# Patient Record
Sex: MALE | Race: WHITE | NOT HISPANIC OR LATINO | URBAN - METROPOLITAN AREA
[De-identification: names, ages, dates, MRNs, and addresses within clinical notes are randomized per-mention and may not be internally consistent; named-entity substitution may affect disease eponyms.]

---

## 2017-06-23 ENCOUNTER — APPOINTMENT (OUTPATIENT)
Dept: ADMISSIONS | Facility: MEDICAL CENTER | Age: 42
End: 2017-06-23
Attending: SURGERY
Payer: COMMERCIAL

## 2017-06-23 RX ORDER — ALBUTEROL SULFATE 90 UG/1
2 AEROSOL, METERED RESPIRATORY (INHALATION) EVERY 6 HOURS PRN
COMMUNITY

## 2017-06-23 RX ORDER — MEPERIDINE HYDROCHLORIDE 50 MG/1
50 TABLET ORAL
COMMUNITY

## 2017-06-23 RX ORDER — GABAPENTIN 300 MG/1
600 CAPSULE ORAL
COMMUNITY

## 2017-06-23 RX ORDER — OMEPRAZOLE 20 MG/1
20 CAPSULE, DELAYED RELEASE ORAL DAILY
COMMUNITY

## 2017-06-27 ENCOUNTER — HOSPITAL ENCOUNTER (OUTPATIENT)
Facility: MEDICAL CENTER | Age: 42
End: 2017-06-27
Attending: SURGERY | Admitting: SURGERY
Payer: COMMERCIAL

## 2017-06-27 VITALS
HEART RATE: 68 BPM | RESPIRATION RATE: 16 BRPM | HEIGHT: 73 IN | BODY MASS INDEX: 30.8 KG/M2 | SYSTOLIC BLOOD PRESSURE: 114 MMHG | DIASTOLIC BLOOD PRESSURE: 83 MMHG | OXYGEN SATURATION: 95 % | TEMPERATURE: 97.5 F | WEIGHT: 232.37 LBS

## 2017-06-27 PROBLEM — M79.10 MYALGIA: Status: ACTIVE | Noted: 2017-06-27

## 2017-06-27 PROCEDURE — 500448 HCHG DRESSING, TELFA 3X4: Performed by: SURGERY

## 2017-06-27 PROCEDURE — 160025 RECOVERY II MINUTES (STATS): Performed by: SURGERY

## 2017-06-27 PROCEDURE — 160046 HCHG PACU - 1ST 60 MINS PHASE II: Performed by: SURGERY

## 2017-06-27 PROCEDURE — 160047 HCHG PACU  - EA ADDL 30 MINS PHASE II: Performed by: SURGERY

## 2017-06-27 PROCEDURE — 700101 HCHG RX REV CODE 250

## 2017-06-27 PROCEDURE — 160002 HCHG RECOVERY MINUTES (STAT): Performed by: SURGERY

## 2017-06-27 PROCEDURE — 88300 SURGICAL PATH GROSS: CPT

## 2017-06-27 PROCEDURE — 160028 HCHG SURGERY MINUTES - 1ST 30 MINS LEVEL 3: Performed by: SURGERY

## 2017-06-27 PROCEDURE — 160048 HCHG OR STATISTICAL LEVEL 1-5: Performed by: SURGERY

## 2017-06-27 PROCEDURE — A9270 NON-COVERED ITEM OR SERVICE: HCPCS

## 2017-06-27 PROCEDURE — 501838 HCHG SUTURE GENERAL: Performed by: SURGERY

## 2017-06-27 PROCEDURE — 502240 HCHG MISC OR SUPPLY RC 0272: Performed by: SURGERY

## 2017-06-27 PROCEDURE — 700102 HCHG RX REV CODE 250 W/ 637 OVERRIDE(OP)

## 2017-06-27 PROCEDURE — 160035 HCHG PACU - 1ST 60 MINS PHASE I: Performed by: SURGERY

## 2017-06-27 PROCEDURE — 160009 HCHG ANES TIME/MIN: Performed by: SURGERY

## 2017-06-27 PROCEDURE — A6402 STERILE GAUZE <= 16 SQ IN: HCPCS | Performed by: SURGERY

## 2017-06-27 PROCEDURE — 700111 HCHG RX REV CODE 636 W/ 250 OVERRIDE (IP)

## 2017-06-27 RX ORDER — IBUPROFEN 800 MG/1
800 TABLET ORAL
COMMUNITY

## 2017-06-27 RX ORDER — BUPIVACAINE HYDROCHLORIDE AND EPINEPHRINE 5; 5 MG/ML; UG/ML
INJECTION, SOLUTION EPIDURAL; INTRACAUDAL; PERINEURAL
Status: DISCONTINUED | OUTPATIENT
Start: 2017-06-27 | End: 2017-06-27 | Stop reason: HOSPADM

## 2017-06-27 RX ORDER — SODIUM CHLORIDE, SODIUM LACTATE, POTASSIUM CHLORIDE, CALCIUM CHLORIDE 600; 310; 30; 20 MG/100ML; MG/100ML; MG/100ML; MG/100ML
INJECTION, SOLUTION INTRAVENOUS CONTINUOUS
Status: DISCONTINUED | OUTPATIENT
Start: 2017-06-27 | End: 2017-06-27 | Stop reason: HOSPADM

## 2017-06-27 RX ORDER — OXYCODONE HCL 5 MG/5 ML
SOLUTION, ORAL ORAL
Status: COMPLETED
Start: 2017-06-27 | End: 2017-06-27

## 2017-06-27 RX ADMIN — SODIUM CHLORIDE, SODIUM LACTATE, POTASSIUM CHLORIDE, CALCIUM CHLORIDE: 600; 310; 30; 20 INJECTION, SOLUTION INTRAVENOUS at 07:15

## 2017-06-27 RX ADMIN — OXYCODONE HYDROCHLORIDE 5 MG: 5 SOLUTION ORAL at 09:40

## 2017-06-27 ASSESSMENT — PAIN SCALES - GENERAL
PAINLEVEL_OUTOF10: 0
PAINLEVEL_OUTOF10: 5

## 2017-06-27 NOTE — IP AVS SNAPSHOT
" Home Care Instructions                                                                                                                Name:Ced Emerson  Medical Record Number:[id number]  CSN: [id number]    YOB: 1975   Age: 42 y.o.  Sex: male  HT:1.854 m (6' 1\") WT: 105.4 kg (232 lb 5.8 oz)          Admit Date: 6/27/2017     Discharge Date:   Today's Date: 6/27/2017  Attending Doctor:  Saqib Andrade M.D.                  Allergies:  Compazine                Discharge Instructions         ACTIVITY: Rest and take it easy for the first 24 hours.  A responsible adult is recommended to remain with you during that time.  It is normal to feel sleepy.  We encourage you to not do anything that requires balance, judgment or coordination.    MILD FLU-LIKE SYMPTOMS ARE NORMAL. YOU MAY EXPERIENCE GENERALIZED MUSCLE ACHES, THROAT IRRITATION, HEADACHE AND/OR SOME NAUSEA.    FOR 24 HOURS DO NOT:  Drive, operate machinery or run household appliances.  Drink beer or alcoholic beverages.   Make important decisions or sign legal documents.    SPECIAL INSTRUCTIONS:   Muscle Biopsy, Care After  Refer to this sheet in the next few weeks. These instructions provide you with information on caring for yourself after your procedure. Your caregiver may also give you more specific instructions. Your treatment has been planned according to current medical practices, but problems sometimes occur. Call your caregiver if you have any problems or questions after your procedure.   HOME CARE INSTRUCTIONS   · Only take over-the-counter or prescription medicines as directed by your caregiver.    · Limit activities or movements as directed by your caregiver.    · Take showers. Do not take baths until your caregiver approves.    · Remove or change any bandages (dressings) only as directed by your caregiver. You may have skin adhesive strips over the biopsy site. Do not take the strips off. They will fall off on their own.  · Keep all " follow-up appointments with your caregiver. Ask when your test results will be ready. Make sure you get your test results.    SEEK MEDICAL CARE IF:   · You have increased bleeding from the biopsy site.   · You develop increasing redness, swelling, or pain in the area of the biopsy.    · You have yellowish white fluid (pus) coming from the biopsy site.   · You have a fever.  · You notice a bad smell coming from the biopsy site or dressing.    · You are light-headed or feel faint.    SEEK IMMEDIATE MEDICAL CARE IF:   · You develop a rash.  · You have difficulty breathing.  · You have any reaction or side effects to medicines given.  MAKE SURE YOU:  · Understand these instructions.  · Will watch your condition.  · Will get help right away if you are not doing well or get worse.     This information is not intended to replace advice given to you by your health care provider. Make sure you discuss any questions you have with your health care provider.     DIET: To avoid nausea, slowly advance diet as tolerated, avoiding spicy or greasy foods for the first day.  Add more substantial food to your diet according to your physician's instructions.  Babies can be fed formula or breast milk as soon as they are hungry.  INCREASE FLUIDS AND FIBER TO AVOID CONSTIPATION.    SURGICAL DRESSING/BATHING: *Keep dressing clean and dry.  Remove in 48hrs (Thursday, 6/29)*    FOLLOW-UP APPOINTMENT:  A follow-up appointment should be arranged with your doctor; call to schedule.    You should CALL YOUR PHYSICIAN if you develop:  Fever greater than 101 degrees F.  Pain not relieved by medication, or persistent nausea or vomiting.  Excessive bleeding (blood soaking through dressing) or unexpected drainage from the wound.  Extreme redness or swelling around the incision site, drainage of pus or foul smelling drainage.  Inability to urinate or empty your bladder within 8 hours.  Problems with breathing or chest pain.    You should call 911 if you  develop problems with breathing or chest pain.  If you are unable to contact your doctor or surgical center, you should go to the nearest emergency room or urgent care center.  Physician's telephone #: *Dr. Andrade [phone]*    If any questions arise, call your doctor.  If your doctor is not available, please feel free to call the Surgical Center at [phone].  The Center is open Monday through Friday from 7AM to 7PM.  You can also call the HEALTH HOTLINE open 24 hours/day, 7 days/week and speak to a nurse at [phone], or toll free at [phone].    A registered nurse may call you a few days after your surgery to see how you are doing after your procedure.    MEDICATIONS: Resume taking daily medication.  Take prescribed pain medication with food.  If no medication is prescribed, you may take non-aspirin pain medication if needed.  PAIN MEDICATION CAN BE VERY CONSTIPATING.  Take a stool softener or laxative such as senokot, pericolace, or milk of magnesia if needed.    Prescription given for *Norco*.  Last oral pain medication given at *9:40am*.    If your physician has prescribed pain medication that includes Acetaminophen (Tylenol), do not take additional Acetaminophen (Tylenol) while taking the prescribed medication.    Depression / Suicide Risk    As you are discharged from this [facility] facility, it is important to learn how to keep safe from harming yourself.    Recognize the warning signs:  · Abrupt changes in personality, positive or negative- including increase in energy   · Giving away possessions  · Change in eating patterns- significant weight changes-  positive or negative  · Change in sleeping patterns- unable to sleep or sleeping all the time   · Unwillingness or inability to communicate  · Depression  · Unusual sadness, discouragement and loneliness  · Talk of wanting to die  · Neglect of personal appearance   · Rebelliousness- reckless behavior  · Withdrawal from  people/activities they love  · Confusion- inability to concentrate     If you or a loved one observes any of these behaviors or has concerns about self-harm, here's what you can do:  · Talk about it- your feelings and reasons for harming yourself  · Remove any means that you might use to hurt yourself (examples: pills, rope, extension cords, firearm)  · Get professional help from the community (Mental Health, Substance Abuse, psychological counseling)  · Do not be alone:Call your Safe Contact- someone whom you trust who will be there for you.  · Call your local CRISIS HOTLINE 751-5996 or 145-513-2593  · Call your local Children's Mobile Crisis Response Team [place] (682) 742-2916 or www.Parascale  · Call the toll free National Suicide Prevention Hotlines   · National Suicide Prevention Lifeline 949-588-YTFS (3967)  · VideoStep Line Network 800-SUICIDE (007-1545)       Medication List      CONTINUE taking these medications        Instructions    Morning Afternoon Evening Bedtime    albuterol 108 (90 BASE) MCG/ACT Aers inhalation aerosol        Inhale 2 Puffs by mouth every 6 hours as needed for Shortness of Breath.   Dose:  2 Puff                        gabapentin 300 MG Caps   Commonly known as:  NEURONTIN        Take 600 mg by mouth every bedtime.   Dose:  600 mg                        ibuprofen 800 MG Tabs   Commonly known as:  MOTRIN        Take 800 mg by mouth 1 time daily as needed.   Dose:  800 mg                        meperidine 50 MG Tabs   Commonly known as:  DEMEROL        Take 50 mg by mouth at bedtime as needed for Severe Pain.   Dose:  50 mg                        PRILOSEC 20 MG delayed-release capsule   Generic drug:  omeprazole        Take 20 mg by mouth every day.   Dose:  20 mg                                Medication Information     Above and/or attached are the medications your physician expects you to take upon discharge. Review all of your home medications and newly ordered  medications with your doctor and/or pharmacist. Follow medication instructions as directed by your doctor and/or pharmacist. Please keep your medication list with you and share with your physician. Update the information when medications are discontinued, doses are changed, or new medications (including over-the-counter products) are added; and carry medication information at all times in the event of emergency situations.        Resources     Quit Smoking / Tobacco Use:    I understand the use of any tobacco products increases my chance of suffering from future heart disease or stroke and could cause other illnesses which may shorten my life. Quitting the use of tobacco products is the single most important thing I can do to improve my health. For further information on smoking / tobacco cessation call a Toll Free Quit Line at 1-191.986.2633 (*National [employer] [place]) or 1-848.816.2270 (American Lung Association) or you can access the web based program at www.lungusa.org.    [place] Tobacco Users Help Line:  (176) 802-4969       Toll Free: 1-337.286.6826  Quit Tobacco Program [facility] (466)732-1234    Crisis Hotline:    [place] Crisis Hotline:  3-549-ASXATCR or 1-969.230.9050    [place] Crisis Hotline:    1-628.763.6601 or 072-507-1326    Discharge Survey:   Thank you for choosing [facility]. We hope we did everything we could to make your hospital stay a pleasant one. You may be receiving a survey and we would appreciate your time and participation in answering the questions. Your input is very valuable to us in our efforts to improve our service to our patients and their families.            Signatures     My signature on this form indicates that:    1. I acknowledge receipt and understanding of these Home Care Instruction.  2. My questions regarding this information have been answered to my satisfaction.  3. I have formulated a plan with my discharge nurse to obtain my prescribed  medications for home.    __________________________________      __________________________________                   Patient Signature                                 Guardian/Responsible Adult Signature      __________________________________                 __________       ________                       Nurse Signature                                               Date                 Time

## 2017-06-27 NOTE — OR SURGEON
Immediate Post-Operative Note      PreOp Diagnosis: myositis    PostOp Diagnosis:   same    Procedure(s):  MUSCLE BIOPSY QUADRACEPS RIGHT - Wound Class: Clean    Surgeon(s):  Saqib Andrade M.D.    Anesthesiologist/Type of Anesthesia:  Anesthesiologist: Gabriel Chavez M.D./General    Surgical Staff:  Circulator: Dominique Ko R.N.  Scrub Person: Elizabeth Smith    Specimen:   muscle    Estimated Blood Loss:   scant    Findings:       Complications:           6/27/2017 9:22 AM Saqib Andrade

## 2017-06-27 NOTE — IP AVS SNAPSHOT
6/27/2017    Ced Emerson  [address]  [place] [address]    Dear Ced:    [facility] wants to ensure your discharge home is safe and you or your loved ones have had all of your questions answered regarding your care after you leave the hospital.    Below is a list of resources and contact information should you have any questions regarding your hospital stay, follow-up instructions, or active medical symptoms.    Questions or Concerns Regarding… Contact   Medical Questions Related to Your Discharge  (7 days a week, 8am-5pm) Contact a Nurse Care Coordinator   [phone]   Medical Questions Not Related to Your Discharge  (24 hours a day / 7 days a week)  Contact the Nurse Health Line   [phone]    Medications or Discharge Instructions Refer to your discharge packet   or contact your [facility] Primary Care Provider   [phone]   Follow-up Appointment(s) Schedule your appointment via Tujia   or contact Scheduling [phone]   Billing Review your statement via Tujia  or contact Billing [phone]   Medical Records Review your records via Tujia   or contact Medical Records [phone]     You may receive a telephone call within two days of discharge. This call is to make certain you understand your discharge instructions and have the opportunity to have any questions answered. You can also easily access your medical information, test results and upcoming appointments via the Tujia free online health management tool. You can learn more and sign up at [employer]/Tujia. For assistance setting up your Tujia account, please call [phone].    Once again, we want to ensure your discharge home is safe and that you have a clear understanding of any next steps in your care. If you have any questions or concerns, please do not hesitate to contact us, we are here for you. Thank you for choosing [facility] for your healthcare needs.    Sincerely,    Your [facility] Healthcare Team

## 2017-06-27 NOTE — DISCHARGE INSTRUCTIONS
ACTIVITY: Rest and take it easy for the first 24 hours.  A responsible adult is recommended to remain with you during that time.  It is normal to feel sleepy.  We encourage you to not do anything that requires balance, judgment or coordination.    MILD FLU-LIKE SYMPTOMS ARE NORMAL. YOU MAY EXPERIENCE GENERALIZED MUSCLE ACHES, THROAT IRRITATION, HEADACHE AND/OR SOME NAUSEA.    FOR 24 HOURS DO NOT:  Drive, operate machinery or run household appliances.  Drink beer or alcoholic beverages.   Make important decisions or sign legal documents.    SPECIAL INSTRUCTIONS:   Muscle Biopsy, Care After  Refer to this sheet in the next few weeks. These instructions provide you with information on caring for yourself after your procedure. Your caregiver may also give you more specific instructions. Your treatment has been planned according to current medical practices, but problems sometimes occur. Call your caregiver if you have any problems or questions after your procedure.   HOME CARE INSTRUCTIONS   · Only take over-the-counter or prescription medicines as directed by your caregiver.    · Limit activities or movements as directed by your caregiver.    · Take showers. Do not take baths until your caregiver approves.    · Remove or change any bandages (dressings) only as directed by your caregiver. You may have skin adhesive strips over the biopsy site. Do not take the strips off. They will fall off on their own.  · Keep all follow-up appointments with your caregiver. Ask when your test results will be ready. Make sure you get your test results.    SEEK MEDICAL CARE IF:   · You have increased bleeding from the biopsy site.   · You develop increasing redness, swelling, or pain in the area of the biopsy.    · You have yellowish white fluid (pus) coming from the biopsy site.   · You have a fever.  · You notice a bad smell coming from the biopsy site or dressing.    · You are light-headed or feel faint.    SEEK IMMEDIATE MEDICAL  CARE IF:   · You develop a rash.  · You have difficulty breathing.  · You have any reaction or side effects to medicines given.  MAKE SURE YOU:  · Understand these instructions.  · Will watch your condition.  · Will get help right away if you are not doing well or get worse.     This information is not intended to replace advice given to you by your health care provider. Make sure you discuss any questions you have with your health care provider.     DIET: To avoid nausea, slowly advance diet as tolerated, avoiding spicy or greasy foods for the first day.  Add more substantial food to your diet according to your physician's instructions.  Babies can be fed formula or breast milk as soon as they are hungry.  INCREASE FLUIDS AND FIBER TO AVOID CONSTIPATION.    SURGICAL DRESSING/BATHING: *Keep dressing clean and dry.  Remove in 48hrs (Thursday, 6/29)*    FOLLOW-UP APPOINTMENT:  A follow-up appointment should be arranged with your doctor; call to schedule.    You should CALL YOUR PHYSICIAN if you develop:  Fever greater than 101 degrees F.  Pain not relieved by medication, or persistent nausea or vomiting.  Excessive bleeding (blood soaking through dressing) or unexpected drainage from the wound.  Extreme redness or swelling around the incision site, drainage of pus or foul smelling drainage.  Inability to urinate or empty your bladder within 8 hours.  Problems with breathing or chest pain.    You should call 911 if you develop problems with breathing or chest pain.  If you are unable to contact your doctor or surgical center, you should go to the nearest emergency room or urgent care center.  Physician's telephone #: *Dr. Andrade [phone]*    If any questions arise, call your doctor.  If your doctor is not available, please feel free to call the Surgical Center at [phone].  The Center is open Monday through Friday from 7AM to 7PM.  You can also call the [email or web address] open 24 hours/day, 7 days/week and  speak to a nurse at [phone], or toll free at [phone].    A registered nurse may call you a few days after your surgery to see how you are doing after your procedure.    MEDICATIONS: Resume taking daily medication.  Take prescribed pain medication with food.  If no medication is prescribed, you may take non-aspirin pain medication if needed.  PAIN MEDICATION CAN BE VERY CONSTIPATING.  Take a stool softener or laxative such as senokot, pericolace, or milk of magnesia if needed.    Prescription given for *Norco*.  Last oral pain medication given at *9:40am*.    If your physician has prescribed pain medication that includes Acetaminophen (Tylenol), do not take additional Acetaminophen (Tylenol) while taking the prescribed medication.    Depression / Suicide Risk    As you are discharged from this [facility] facility, it is important to learn how to keep safe from harming yourself.    Recognize the warning signs:  · Abrupt changes in personality, positive or negative- including increase in energy   · Giving away possessions  · Change in eating patterns- significant weight changes-  positive or negative  · Change in sleeping patterns- unable to sleep or sleeping all the time   · Unwillingness or inability to communicate  · Depression  · Unusual sadness, discouragement and loneliness  · Talk of wanting to die  · Neglect of personal appearance   · Rebelliousness- reckless behavior  · Withdrawal from people/activities they love  · Confusion- inability to concentrate     If you or a loved one observes any of these behaviors or has concerns about self-harm, here's what you can do:  · Talk about it- your feelings and reasons for harming yourself  · Remove any means that you might use to hurt yourself (examples: pills, rope, extension cords, firearm)  · Get professional help from the community (Mental Health, Substance Abuse, psychological counseling)  · Do not be alone:Call your Safe Contact- someone whom you  trust who will be there for you.  · Call your local CRISIS HOTLINE 830-3913 or 361-331-1304  · Call your local Children's Mobile Crisis Response Team [place] (688) 516-7878 or www.LikeWhere  · Call the toll free National Suicide Prevention Hotlines   · National Suicide Prevention Lifeline 752-555-MMQY (0093)  · National [employer] Line Network 800-SUICIDE (607-8028)

## 2017-06-30 NOTE — OR SURGEON
Immediate Post-Operative Note      PreOp Diagnosis: generalized weakness with elevated CPK-rule out myositis    PostOp Diagnosis: same    Procedure(s):  MUSCLE BIOPSY QUADRACEPS RIGHT - Wound Class: Clean    Surgeon(s):  Saqib Andrade M.D.    Anesthesiologist/Type of Anesthesia:  Anesthesiologist: Gabriel Chavez M.D./General    Surgical Staff:  Circulator: Dominique Ko R.N.  Scrub Person: Elizabeth Smith    Specimen: muscle biopsy          Estimated Blood Loss:         Findings:       Complications:   The patient was taken to the operating room satisfactory general anesthesia was induced via endotracheal intubation    Patient was prepped and draped    Local instilled air while aspect of the proximal right thigh    A longitudinal incision was made and carried down through the skin and subcutaneous tissue and the fascia was scored    A ring clamp was used to sample some underlying muscle and this was given fresh to pathology    Wounds closed with interrupted 3-0 Vicryl pop-off's in 2 layers of running 4-0 Vicryl septic skin sutures and Steri-Strips    The wound was then dressed and patient sent to the recovery room in good condition    Specimen sent to pathology labeled muscle biopsy        6/30/2017 4:08 PM Saqib Andrade

## 2017-07-24 NOTE — OP REPORT
6-27-17    Preoperative diagnosis: Unexplained myalgia    Postoperative diagnosis: Same    Procedure: Left quadriceps muscle biopsy    EBL: 2 cc    Operating surgeon:Lupe    Indications for procedure: This 42-year-old male has generalized fatigue and muscle weakness and abnormal CPK.  He is referred for a quadriceps muscle biopsy to rule out an underlying myopathy or myositis    Description of procedure: The patient was taken to the operating room and satisfactory general anesthesia was induced by way of endotracheal intubation.  Patient was prepped and draped.  A small incision was made on the anterolateral aspect of the proximal left thigh and carried down to the skin and subcutaneous tissue and fascia with a small portion of muscle removed with a ring forceps with the Bovie    The wound was closed with 3-0 Vicryl pop offs and running 4-0 Monocryl subcuticular skin stitches and Steri-Strips    The wound was dressed and the patient sent to the recovery room in good condition    Specimen sent to pathology several muscle biopsy    Copy to my office

## 2025-03-12 ENCOUNTER — HOSPITAL ENCOUNTER (OUTPATIENT)
Dept: RADIOLOGY | Facility: MEDICAL CENTER | Age: 50
End: 2025-03-12

## 2025-03-12 ENCOUNTER — APPOINTMENT (OUTPATIENT)
Dept: RADIOLOGY | Facility: MEDICAL CENTER | Age: 50
DRG: 053 | End: 2025-03-12
Attending: STUDENT IN AN ORGANIZED HEALTH CARE EDUCATION/TRAINING PROGRAM
Payer: OTHER MISCELLANEOUS

## 2025-03-12 ENCOUNTER — HOSPITAL ENCOUNTER (INPATIENT)
Facility: MEDICAL CENTER | Age: 50
LOS: 6 days | DRG: 053 | End: 2025-03-18
Attending: STUDENT IN AN ORGANIZED HEALTH CARE EDUCATION/TRAINING PROGRAM | Admitting: SURGERY
Payer: OTHER MISCELLANEOUS

## 2025-03-12 DIAGNOSIS — R20.2 PARESTHESIAS: ICD-10-CM

## 2025-03-12 DIAGNOSIS — G95.19 EDEMA OF SPINAL CORD (HCC): ICD-10-CM
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
## 2025-03-12 DIAGNOSIS — T14.90XA TRAUMA: ICD-10-CM

## 2025-03-12 DIAGNOSIS — S14.125A: ICD-10-CM

## 2025-03-12 PROBLEM — Z53.09 CONTRAINDICATION TO DEEP VEIN THROMBOSIS (DVT) PROPHYLAXIS: Status: ACTIVE | Noted: 2025-03-12

## 2025-03-12 LAB
ABO GROUP BLD: NORMAL
ALBUMIN SERPL BCP-MCNC: 4.4 G/DL (ref 3.2–4.9)
ALBUMIN/GLOB SERPL: 1.8 G/DL
ALP SERPL-CCNC: 104 U/L (ref 30–99)
ALT SERPL-CCNC: 31 U/L (ref 2–50)
ANION GAP SERPL CALC-SCNC: 11 MMOL/L (ref 7–16)
APTT PPP: 30.4 SEC (ref 24.7–36)
AST SERPL-CCNC: 24 U/L (ref 12–45)
BILIRUB SERPL-MCNC: 1.3 MG/DL (ref 0.1–1.5)
BLD GP AB SCN SERPL QL: NORMAL
BUN SERPL-MCNC: 20 MG/DL (ref 8–22)
CALCIUM ALBUM COR SERPL-MCNC: 8.9 MG/DL (ref 8.5–10.5)
CALCIUM SERPL-MCNC: 9.2 MG/DL (ref 8.5–10.5)
CHLORIDE SERPL-SCNC: 103 MMOL/L (ref 96–112)
CO2 SERPL-SCNC: 23 MMOL/L (ref 20–33)
CREAT SERPL-MCNC: 0.96 MG/DL (ref 0.5–1.4)
EKG IMPRESSION: NORMAL
ERYTHROCYTE [DISTWIDTH] IN BLOOD BY AUTOMATED COUNT: 39.8 FL (ref 35.9–50)
ETHANOL BLD-MCNC: <10.1 MG/DL
GFR SERPLBLD CREATININE-BSD FMLA CKD-EPI: 95 ML/MIN/1.73 M 2
GLOBULIN SER CALC-MCNC: 2.4 G/DL (ref 1.9–3.5)
GLUCOSE SERPL-MCNC: 106 MG/DL (ref 65–99)
HCT VFR BLD AUTO: 45.1 % (ref 42–52)
HGB BLD-MCNC: 16 G/DL (ref 14–18)
INR PPP: 1.09 (ref 0.87–1.13)
MCH RBC QN AUTO: 28.3 PG (ref 27–33)
MCHC RBC AUTO-ENTMCNC: 35.5 G/DL (ref 32.3–36.5)
MCV RBC AUTO: 79.7 FL (ref 81.4–97.8)
PLATELET # BLD AUTO: 209 K/UL (ref 164–446)
PMV BLD AUTO: 8.1 FL (ref 9–12.9)
POTASSIUM SERPL-SCNC: 3.9 MMOL/L (ref 3.6–5.5)
PROT SERPL-MCNC: 6.8 G/DL (ref 6–8.2)
PROTHROMBIN TIME: 14.1 SEC (ref 12–14.6)
RBC # BLD AUTO: 5.66 M/UL (ref 4.7–6.1)
RH BLD: NORMAL
SODIUM SERPL-SCNC: 137 MMOL/L (ref 135–145)
WBC # BLD AUTO: 8.2 K/UL (ref 4.8–10.8)

## 2025-03-12 PROCEDURE — 700105 HCHG RX REV CODE 258: Performed by: NURSE PRACTITIONER

## 2025-03-12 PROCEDURE — 85610 PROTHROMBIN TIME: CPT

## 2025-03-12 PROCEDURE — 86850 RBC ANTIBODY SCREEN: CPT

## 2025-03-12 PROCEDURE — G0390 TRAUMA RESPONS W/HOSP CRITI: HCPCS

## 2025-03-12 PROCEDURE — 85027 COMPLETE CBC AUTOMATED: CPT

## 2025-03-12 PROCEDURE — 99223 1ST HOSP IP/OBS HIGH 75: CPT | Performed by: SURGERY

## 2025-03-12 PROCEDURE — 99291 CRITICAL CARE FIRST HOUR: CPT

## 2025-03-12 PROCEDURE — 86901 BLOOD TYPING SEROLOGIC RH(D): CPT

## 2025-03-12 PROCEDURE — 85730 THROMBOPLASTIN TIME PARTIAL: CPT

## 2025-03-12 PROCEDURE — 86900 BLOOD TYPING SEROLOGIC ABO: CPT

## 2025-03-12 PROCEDURE — A9270 NON-COVERED ITEM OR SERVICE: HCPCS | Performed by: NURSE PRACTITIONER

## 2025-03-12 PROCEDURE — 700102 HCHG RX REV CODE 250 W/ 637 OVERRIDE(OP): Performed by: NURSE PRACTITIONER

## 2025-03-12 PROCEDURE — 36415 COLL VENOUS BLD VENIPUNCTURE: CPT

## 2025-03-12 PROCEDURE — 80053 COMPREHEN METABOLIC PANEL: CPT

## 2025-03-12 PROCEDURE — 82077 ASSAY SPEC XCP UR&BREATH IA: CPT

## 2025-03-12 PROCEDURE — 770022 HCHG ROOM/CARE - ICU (200)

## 2025-03-12 PROCEDURE — 700111 HCHG RX REV CODE 636 W/ 250 OVERRIDE (IP): Mod: JZ | Performed by: NURSE PRACTITIONER

## 2025-03-12 PROCEDURE — 93005 ELECTROCARDIOGRAM TRACING: CPT | Mod: TC | Performed by: SURGERY

## 2025-03-12 RX ORDER — SODIUM CHLORIDE 9 MG/ML
INJECTION, SOLUTION INTRAVENOUS CONTINUOUS
Status: DISCONTINUED | OUTPATIENT
Start: 2025-03-12 | End: 2025-03-16 | Stop reason: ALTCHOICE

## 2025-03-12 RX ORDER — OXYCODONE HYDROCHLORIDE 5 MG/1
5 TABLET ORAL
Refills: 0 | Status: DISCONTINUED | OUTPATIENT
Start: 2025-03-12 | End: 2025-03-18 | Stop reason: HOSPADM

## 2025-03-12 RX ORDER — POLYETHYLENE GLYCOL 3350 17 G/17G
1 POWDER, FOR SOLUTION ORAL 2 TIMES DAILY
Status: DISCONTINUED | OUTPATIENT
Start: 2025-03-12 | End: 2025-03-18 | Stop reason: HOSPADM

## 2025-03-12 RX ORDER — SODIUM CHLORIDE 9 MG/ML
1000 INJECTION, SOLUTION INTRAVENOUS
Status: COMPLETED | OUTPATIENT
Start: 2025-03-12 | End: 2025-03-13

## 2025-03-12 RX ORDER — BISACODYL 10 MG
10 SUPPOSITORY, RECTAL RECTAL
Status: DISCONTINUED | OUTPATIENT
Start: 2025-03-12 | End: 2025-03-18 | Stop reason: HOSPADM

## 2025-03-12 RX ORDER — GABAPENTIN 100 MG/1
100 CAPSULE ORAL EVERY 8 HOURS
Status: DISCONTINUED | OUTPATIENT
Start: 2025-03-12 | End: 2025-03-14

## 2025-03-12 RX ORDER — ONDANSETRON 2 MG/ML
4 INJECTION INTRAMUSCULAR; INTRAVENOUS EVERY 4 HOURS PRN
Status: DISCONTINUED | OUTPATIENT
Start: 2025-03-12 | End: 2025-03-18 | Stop reason: HOSPADM

## 2025-03-12 RX ORDER — ALBUTEROL SULFATE 90 UG/1
2 INHALANT RESPIRATORY (INHALATION) PRN
Status: DISCONTINUED | OUTPATIENT
Start: 2025-03-12 | End: 2025-03-13

## 2025-03-12 RX ORDER — ACETAMINOPHEN 500 MG
1000 TABLET ORAL EVERY 6 HOURS
Status: DISCONTINUED | OUTPATIENT
Start: 2025-03-13 | End: 2025-03-13

## 2025-03-12 RX ORDER — ACETAMINOPHEN 500 MG
1000 TABLET ORAL EVERY 6 HOURS PRN
Status: DISCONTINUED | OUTPATIENT
Start: 2025-03-18 | End: 2025-03-13

## 2025-03-12 RX ORDER — METAXALONE 800 MG/1
800 TABLET ORAL 3 TIMES DAILY
Status: DISCONTINUED | OUTPATIENT
Start: 2025-03-13 | End: 2025-03-18 | Stop reason: HOSPADM

## 2025-03-12 RX ORDER — AMOXICILLIN 250 MG
1 CAPSULE ORAL
Status: DISCONTINUED | OUTPATIENT
Start: 2025-03-12 | End: 2025-03-18 | Stop reason: HOSPADM

## 2025-03-12 RX ORDER — ONDANSETRON 4 MG/1
4 TABLET, ORALLY DISINTEGRATING ORAL EVERY 4 HOURS PRN
Status: DISCONTINUED | OUTPATIENT
Start: 2025-03-12 | End: 2025-03-18 | Stop reason: HOSPADM

## 2025-03-12 RX ORDER — DOCUSATE SODIUM 100 MG/1
100 CAPSULE, LIQUID FILLED ORAL 2 TIMES DAILY
Status: DISCONTINUED | OUTPATIENT
Start: 2025-03-12 | End: 2025-03-18 | Stop reason: HOSPADM

## 2025-03-12 RX ORDER — OXYCODONE HYDROCHLORIDE 10 MG/1
10 TABLET ORAL
Refills: 0 | Status: DISCONTINUED | OUTPATIENT
Start: 2025-03-12 | End: 2025-03-18 | Stop reason: HOSPADM

## 2025-03-12 RX ORDER — HYDROMORPHONE HYDROCHLORIDE 1 MG/ML
0.5 INJECTION, SOLUTION INTRAMUSCULAR; INTRAVENOUS; SUBCUTANEOUS
Status: DISCONTINUED | OUTPATIENT
Start: 2025-03-12 | End: 2025-03-18 | Stop reason: HOSPADM

## 2025-03-12 RX ORDER — SODIUM PHOSPHATE,MONO-DIBASIC 19G-7G/118
1 ENEMA (ML) RECTAL
Status: DISCONTINUED | OUTPATIENT
Start: 2025-03-12 | End: 2025-03-18 | Stop reason: HOSPADM

## 2025-03-12 RX ORDER — AMOXICILLIN 250 MG
1 CAPSULE ORAL NIGHTLY
Status: DISCONTINUED | OUTPATIENT
Start: 2025-03-12 | End: 2025-03-18 | Stop reason: HOSPADM

## 2025-03-12 RX ORDER — PROCHLORPERAZINE EDISYLATE 5 MG/ML
5-10 INJECTION INTRAMUSCULAR; INTRAVENOUS EVERY 4 HOURS PRN
Status: DISCONTINUED | OUTPATIENT
Start: 2025-03-12 | End: 2025-03-12

## 2025-03-12 RX ORDER — MIDODRINE HYDROCHLORIDE 5 MG/1
5 TABLET ORAL EVERY 8 HOURS
Status: DISCONTINUED | OUTPATIENT
Start: 2025-03-12 | End: 2025-03-15

## 2025-03-12 RX ORDER — NOREPINEPHRINE BITARTRATE 0.03 MG/ML
0-1 INJECTION, SOLUTION INTRAVENOUS CONTINUOUS
Status: DISCONTINUED | OUTPATIENT
Start: 2025-03-12 | End: 2025-03-16

## 2025-03-12 RX ADMIN — ONDANSETRON 4 MG: 2 INJECTION INTRAMUSCULAR; INTRAVENOUS at 22:51

## 2025-03-12 RX ADMIN — SODIUM CHLORIDE: 9 INJECTION, SOLUTION INTRAVENOUS at 23:15

## 2025-03-12 RX ADMIN — MIDODRINE HYDROCHLORIDE 5 MG: 5 TABLET ORAL at 23:12

## 2025-03-12 RX ADMIN — GABAPENTIN 100 MG: 100 CAPSULE ORAL at 23:12

## 2025-03-12 RX ADMIN — ACETAMINOPHEN 1000 MG: 500 TABLET ORAL at 23:12

## 2025-03-12 ASSESSMENT — COPD QUESTIONNAIRES
DURING THE PAST 4 WEEKS HOW MUCH DID YOU FEEL SHORT OF BREATH: NONE/LITTLE OF THE TIME
HAVE YOU SMOKED AT LEAST 100 CIGARETTES IN YOUR ENTIRE LIFE: YES
COPD SCREENING SCORE: 3
DO YOU EVER COUGH UP ANY MUCUS OR PHLEGM?: NO/ONLY WITH OCCASIONAL COLDS OR INFECTIONS

## 2025-03-12 ASSESSMENT — PAIN DESCRIPTION - PAIN TYPE: TYPE: ACUTE PAIN

## 2025-03-13 ENCOUNTER — APPOINTMENT (OUTPATIENT)
Dept: RADIOLOGY | Facility: MEDICAL CENTER | Age: 50
DRG: 053 | End: 2025-03-13
Attending: PHYSICIAN ASSISTANT
Payer: OTHER MISCELLANEOUS

## 2025-03-13 ENCOUNTER — APPOINTMENT (OUTPATIENT)
Dept: RADIOLOGY | Facility: MEDICAL CENTER | Age: 50
DRG: 053 | End: 2025-03-13
Attending: NURSE PRACTITIONER
Payer: OTHER MISCELLANEOUS

## 2025-03-13 PROBLEM — R10.13 DYSPEPSIA: Status: ACTIVE | Noted: 2025-03-13

## 2025-03-13 PROBLEM — Z78.9 NO CONTRAINDICATION TO DEEP VEIN THROMBOSIS (DVT) PROPHYLAXIS: Status: ACTIVE | Noted: 2025-03-12

## 2025-03-13 PROBLEM — J45.909 ASTHMA: Status: ACTIVE | Noted: 2025-03-13

## 2025-03-13 PROBLEM — K21.9 ACID REFLUX: Status: ACTIVE | Noted: 2025-03-13

## 2025-03-13 LAB
ABO + RH BLD: NORMAL
ALBUMIN SERPL BCP-MCNC: 3.9 G/DL (ref 3.2–4.9)
ALBUMIN/GLOB SERPL: 1.7 G/DL
ALP SERPL-CCNC: 94 U/L (ref 30–99)
ALT SERPL-CCNC: 25 U/L (ref 2–50)
ANION GAP SERPL CALC-SCNC: 11 MMOL/L (ref 7–16)
AST SERPL-CCNC: 21 U/L (ref 12–45)
BASOPHILS # BLD AUTO: 0.4 % (ref 0–1.8)
BASOPHILS # BLD: 0.03 K/UL (ref 0–0.12)
BILIRUB SERPL-MCNC: 1.3 MG/DL (ref 0.1–1.5)
BUN SERPL-MCNC: 20 MG/DL (ref 8–22)
CALCIUM ALBUM COR SERPL-MCNC: 8.3 MG/DL (ref 8.5–10.5)
CALCIUM SERPL-MCNC: 8.2 MG/DL (ref 8.5–10.5)
CHLORIDE SERPL-SCNC: 106 MMOL/L (ref 96–112)
CO2 SERPL-SCNC: 21 MMOL/L (ref 20–33)
CREAT SERPL-MCNC: 0.92 MG/DL (ref 0.5–1.4)
EOSINOPHIL # BLD AUTO: 0.04 K/UL (ref 0–0.51)
EOSINOPHIL NFR BLD: 0.6 % (ref 0–6.9)
ERYTHROCYTE [DISTWIDTH] IN BLOOD BY AUTOMATED COUNT: 40.5 FL (ref 35.9–50)
GFR SERPLBLD CREATININE-BSD FMLA CKD-EPI: 100 ML/MIN/1.73 M 2
GLOBULIN SER CALC-MCNC: 2.3 G/DL (ref 1.9–3.5)
GLUCOSE BLD STRIP.AUTO-MCNC: 117 MG/DL (ref 65–99)
GLUCOSE BLD STRIP.AUTO-MCNC: 124 MG/DL (ref 65–99)
GLUCOSE SERPL-MCNC: 96 MG/DL (ref 65–99)
HCT VFR BLD AUTO: 44.6 % (ref 42–52)
HGB BLD-MCNC: 15.2 G/DL (ref 14–18)
IMM GRANULOCYTES # BLD AUTO: 0.02 K/UL (ref 0–0.11)
IMM GRANULOCYTES NFR BLD AUTO: 0.3 % (ref 0–0.9)
LYMPHOCYTES # BLD AUTO: 1.29 K/UL (ref 1–4.8)
LYMPHOCYTES NFR BLD: 18.6 % (ref 22–41)
MAGNESIUM SERPL-MCNC: 1.8 MG/DL (ref 1.5–2.5)
MCH RBC QN AUTO: 27.9 PG (ref 27–33)
MCHC RBC AUTO-ENTMCNC: 34.1 G/DL (ref 32.3–36.5)
MCV RBC AUTO: 82 FL (ref 81.4–97.8)
MONOCYTES # BLD AUTO: 0.56 K/UL (ref 0–0.85)
MONOCYTES NFR BLD AUTO: 8.1 % (ref 0–13.4)
NEUTROPHILS # BLD AUTO: 4.98 K/UL (ref 1.82–7.42)
NEUTROPHILS NFR BLD: 72 % (ref 44–72)
NRBC # BLD AUTO: 0 K/UL
NRBC BLD-RTO: 0 /100 WBC (ref 0–0.2)
PHOSPHATE SERPL-MCNC: 2.8 MG/DL (ref 2.5–4.5)
PLATELET # BLD AUTO: 207 K/UL (ref 164–446)
PMV BLD AUTO: 8.1 FL (ref 9–12.9)
POTASSIUM SERPL-SCNC: 4.3 MMOL/L (ref 3.6–5.5)
PROT SERPL-MCNC: 6.2 G/DL (ref 6–8.2)
RBC # BLD AUTO: 5.44 M/UL (ref 4.7–6.1)
SODIUM SERPL-SCNC: 138 MMOL/L (ref 135–145)
WBC # BLD AUTO: 6.9 K/UL (ref 4.8–10.8)

## 2025-03-13 PROCEDURE — 700111 HCHG RX REV CODE 636 W/ 250 OVERRIDE (IP): Performed by: PHYSICIAN ASSISTANT

## 2025-03-13 PROCEDURE — 700111 HCHG RX REV CODE 636 W/ 250 OVERRIDE (IP): Mod: JZ | Performed by: SURGERY

## 2025-03-13 PROCEDURE — A9270 NON-COVERED ITEM OR SERVICE: HCPCS | Performed by: PHYSICIAN ASSISTANT

## 2025-03-13 PROCEDURE — A9270 NON-COVERED ITEM OR SERVICE: HCPCS | Performed by: NURSE PRACTITIONER

## 2025-03-13 PROCEDURE — 700111 HCHG RX REV CODE 636 W/ 250 OVERRIDE (IP): Mod: JZ | Performed by: NURSE PRACTITIONER

## 2025-03-13 PROCEDURE — 82962 GLUCOSE BLOOD TEST: CPT

## 2025-03-13 PROCEDURE — 83735 ASSAY OF MAGNESIUM: CPT

## 2025-03-13 PROCEDURE — 770022 HCHG ROOM/CARE - ICU (200)

## 2025-03-13 PROCEDURE — 99291 CRITICAL CARE FIRST HOUR: CPT | Performed by: SURGERY

## 2025-03-13 PROCEDURE — 84100 ASSAY OF PHOSPHORUS: CPT

## 2025-03-13 PROCEDURE — 74018 RADEX ABDOMEN 1 VIEW: CPT

## 2025-03-13 PROCEDURE — 700101 HCHG RX REV CODE 250: Performed by: NURSE PRACTITIONER

## 2025-03-13 PROCEDURE — 86901 BLOOD TYPING SEROLOGIC RH(D): CPT

## 2025-03-13 PROCEDURE — 700102 HCHG RX REV CODE 250 W/ 637 OVERRIDE(OP): Performed by: NURSE PRACTITIONER

## 2025-03-13 PROCEDURE — 700102 HCHG RX REV CODE 250 W/ 637 OVERRIDE(OP): Performed by: PHYSICIAN ASSISTANT

## 2025-03-13 PROCEDURE — 85025 COMPLETE CBC W/AUTO DIFF WBC: CPT

## 2025-03-13 PROCEDURE — 86900 BLOOD TYPING SEROLOGIC ABO: CPT

## 2025-03-13 PROCEDURE — 71045 X-RAY EXAM CHEST 1 VIEW: CPT

## 2025-03-13 PROCEDURE — 700105 HCHG RX REV CODE 258: Performed by: NURSE PRACTITIONER

## 2025-03-13 PROCEDURE — 700105 HCHG RX REV CODE 258: Performed by: SURGERY

## 2025-03-13 PROCEDURE — 80053 COMPREHEN METABOLIC PANEL: CPT

## 2025-03-13 RX ORDER — ACETAMINOPHEN 500 MG
1000 TABLET ORAL EVERY 8 HOURS PRN
Status: DISCONTINUED | OUTPATIENT
Start: 2025-03-17 | End: 2025-03-18 | Stop reason: HOSPADM

## 2025-03-13 RX ORDER — BUTALBITAL, ACETAMINOPHEN AND CAFFEINE 50; 325; 40 MG/1; MG/1; MG/1
1 TABLET ORAL EVERY 8 HOURS PRN
Status: DISCONTINUED | OUTPATIENT
Start: 2025-03-13 | End: 2025-03-13

## 2025-03-13 RX ORDER — ALBUTEROL SULFATE 90 UG/1
2 INHALANT RESPIRATORY (INHALATION) EVERY 6 HOURS PRN
Status: DISCONTINUED | OUTPATIENT
Start: 2025-03-13 | End: 2025-03-18 | Stop reason: HOSPADM

## 2025-03-13 RX ORDER — OMEPRAZOLE 20 MG/1
20 CAPSULE, DELAYED RELEASE ORAL DAILY
Status: DISCONTINUED | OUTPATIENT
Start: 2025-03-13 | End: 2025-03-18 | Stop reason: HOSPADM

## 2025-03-13 RX ORDER — ENOXAPARIN SODIUM 100 MG/ML
30 INJECTION SUBCUTANEOUS EVERY 12 HOURS
Status: DISCONTINUED | OUTPATIENT
Start: 2025-03-13 | End: 2025-03-18 | Stop reason: HOSPADM

## 2025-03-13 RX ORDER — ACETAMINOPHEN 500 MG
1000 TABLET ORAL EVERY 8 HOURS
Status: DISPENSED | OUTPATIENT
Start: 2025-03-13 | End: 2025-03-17

## 2025-03-13 RX ORDER — MAGNESIUM SULFATE HEPTAHYDRATE 40 MG/ML
2 INJECTION, SOLUTION INTRAVENOUS ONCE
Status: COMPLETED | OUTPATIENT
Start: 2025-03-13 | End: 2025-03-13

## 2025-03-13 RX ADMIN — POLYETHYLENE GLYCOL 3350 1 PACKET: 17 POWDER, FOR SOLUTION ORAL at 17:19

## 2025-03-13 RX ADMIN — MIDODRINE HYDROCHLORIDE 5 MG: 5 TABLET ORAL at 15:32

## 2025-03-13 RX ADMIN — METAXALONE 800 MG: 800 TABLET ORAL at 17:19

## 2025-03-13 RX ADMIN — DOCUSATE SODIUM 100 MG: 100 CAPSULE, LIQUID FILLED ORAL at 17:19

## 2025-03-13 RX ADMIN — ACETAMINOPHEN 1000 MG: 500 TABLET ORAL at 21:20

## 2025-03-13 RX ADMIN — FAMOTIDINE 20 MG: 10 INJECTION, SOLUTION INTRAVENOUS at 13:18

## 2025-03-13 RX ADMIN — NOREPINEPHRINE BITARTRATE 0.1 MCG/KG/MIN: 1 INJECTION INTRAVENOUS at 03:34

## 2025-03-13 RX ADMIN — SODIUM CHLORIDE: 9 INJECTION, SOLUTION INTRAVENOUS at 20:11

## 2025-03-13 RX ADMIN — OXYCODONE HYDROCHLORIDE 5 MG: 5 TABLET ORAL at 01:22

## 2025-03-13 RX ADMIN — ENOXAPARIN SODIUM 30 MG: 100 INJECTION SUBCUTANEOUS at 17:19

## 2025-03-13 RX ADMIN — MIDODRINE HYDROCHLORIDE 5 MG: 5 TABLET ORAL at 21:20

## 2025-03-13 RX ADMIN — GABAPENTIN 100 MG: 100 CAPSULE ORAL at 21:20

## 2025-03-13 RX ADMIN — GABAPENTIN 100 MG: 100 CAPSULE ORAL at 13:15

## 2025-03-13 RX ADMIN — OMEPRAZOLE 20 MG: 20 CAPSULE, DELAYED RELEASE ORAL at 15:32

## 2025-03-13 RX ADMIN — GABAPENTIN 100 MG: 100 CAPSULE ORAL at 05:26

## 2025-03-13 RX ADMIN — CALCIUM CHLORIDE 1000 MG: 100 INJECTION, SOLUTION INTRAVENOUS at 10:06

## 2025-03-13 RX ADMIN — SODIUM CHLORIDE 1000 ML: 9 INJECTION, SOLUTION INTRAVENOUS at 00:00

## 2025-03-13 RX ADMIN — SODIUM CHLORIDE: 9 INJECTION, SOLUTION INTRAVENOUS at 08:47

## 2025-03-13 RX ADMIN — ONDANSETRON 4 MG: 2 INJECTION INTRAMUSCULAR; INTRAVENOUS at 10:33

## 2025-03-13 RX ADMIN — METAXALONE 800 MG: 800 TABLET ORAL at 05:25

## 2025-03-13 RX ADMIN — OXYCODONE HYDROCHLORIDE 10 MG: 10 TABLET ORAL at 07:20

## 2025-03-13 RX ADMIN — SODIUM CHLORIDE 1000 ML: 9 INJECTION, SOLUTION INTRAVENOUS at 02:20

## 2025-03-13 RX ADMIN — ALBUTEROL SULFATE 2 PUFF: 90 AEROSOL, METERED RESPIRATORY (INHALATION) at 18:03

## 2025-03-13 RX ADMIN — MAGNESIUM SULFATE HEPTAHYDRATE 2 G: 2 INJECTION, SOLUTION INTRAVENOUS at 10:00

## 2025-03-13 RX ADMIN — METAXALONE 800 MG: 800 TABLET ORAL at 13:15

## 2025-03-13 RX ADMIN — ACETAMINOPHEN 1000 MG: 500 TABLET ORAL at 13:15

## 2025-03-13 RX ADMIN — MIDODRINE HYDROCHLORIDE 5 MG: 5 TABLET ORAL at 05:26

## 2025-03-13 RX ADMIN — OXYCODONE HYDROCHLORIDE 10 MG: 10 TABLET ORAL at 10:24

## 2025-03-13 RX ADMIN — ACETAMINOPHEN 1000 MG: 500 TABLET ORAL at 05:25

## 2025-03-13 SDOH — ECONOMIC STABILITY: TRANSPORTATION INSECURITY
IN THE PAST 12 MONTHS, HAS LACK OF RELIABLE TRANSPORTATION KEPT YOU FROM MEDICAL APPOINTMENTS, MEETINGS, WORK OR FROM GETTING THINGS NEEDED FOR DAILY LIVING?: NO

## 2025-03-13 SDOH — ECONOMIC STABILITY: TRANSPORTATION INSECURITY
IN THE PAST 12 MONTHS, HAS THE LACK OF TRANSPORTATION KEPT YOU FROM MEDICAL APPOINTMENTS OR FROM GETTING MEDICATIONS?: NO

## 2025-03-13 ASSESSMENT — LIFESTYLE VARIABLES
HAVE PEOPLE ANNOYED YOU BY CRITICIZING YOUR DRINKING: NO
ON A TYPICAL DAY WHEN YOU DRINK ALCOHOL HOW MANY DRINKS DO YOU HAVE: 0
EVER FELT BAD OR GUILTY ABOUT YOUR DRINKING: NO
CONSUMPTION TOTAL: NEGATIVE
EVER HAD A DRINK FIRST THING IN THE MORNING TO STEADY YOUR NERVES TO GET RID OF A HANGOVER: NO
AVERAGE NUMBER OF DAYS PER WEEK YOU HAVE A DRINK CONTAINING ALCOHOL: 0
TOTAL SCORE: 0
ALCOHOL_USE: NO
HOW MANY TIMES IN THE PAST YEAR HAVE YOU HAD 5 OR MORE DRINKS IN A DAY: 0
HAVE YOU EVER FELT YOU SHOULD CUT DOWN ON YOUR DRINKING: NO
DOES PATIENT WANT TO STOP DRINKING: NO

## 2025-03-13 ASSESSMENT — COGNITIVE AND FUNCTIONAL STATUS - GENERAL
TOILETING: A LITTLE
TURNING FROM BACK TO SIDE WHILE IN FLAT BAD: A LITTLE
CLIMB 3 TO 5 STEPS WITH RAILING: A LITTLE
PERSONAL GROOMING: A LITTLE
EATING MEALS: A LITTLE
SUGGESTED CMS G CODE MODIFIER DAILY ACTIVITY: CK
DAILY ACTIVITIY SCORE: 18
MOVING TO AND FROM BED TO CHAIR: A LITTLE
SUGGESTED CMS G CODE MODIFIER MOBILITY: CK
HELP NEEDED FOR BATHING: A LITTLE
STANDING UP FROM CHAIR USING ARMS: A LITTLE
MOBILITY SCORE: 18
DRESSING REGULAR UPPER BODY CLOTHING: A LITTLE
MOVING FROM LYING ON BACK TO SITTING ON SIDE OF FLAT BED: A LITTLE
DRESSING REGULAR LOWER BODY CLOTHING: A LITTLE
WALKING IN HOSPITAL ROOM: A LITTLE

## 2025-03-13 ASSESSMENT — ENCOUNTER SYMPTOMS
TINGLING: 1
MYALGIAS: 1
FOCAL WEAKNESS: 1
FEVER: 0
NECK PAIN: 1
SENSORY CHANGE: 1
COUGH: 0
CHILLS: 0
SHORTNESS OF BREATH: 0
HEADACHES: 1

## 2025-03-13 ASSESSMENT — PAIN DESCRIPTION - PAIN TYPE
TYPE: ACUTE PAIN

## 2025-03-13 ASSESSMENT — PATIENT HEALTH QUESTIONNAIRE - PHQ9
2. FEELING DOWN, DEPRESSED, IRRITABLE, OR HOPELESS: NOT AT ALL
1. LITTLE INTEREST OR PLEASURE IN DOING THINGS: NOT AT ALL
SUM OF ALL RESPONSES TO PHQ9 QUESTIONS 1 AND 2: 0

## 2025-03-13 ASSESSMENT — SOCIAL DETERMINANTS OF HEALTH (SDOH)
WITHIN THE LAST YEAR, HAVE TO BEEN RAPED OR FORCED TO HAVE ANY KIND OF SEXUAL ACTIVITY BY YOUR PARTNER OR EX-PARTNER?: NO
IN THE PAST 12 MONTHS, HAS THE ELECTRIC, GAS, OIL, OR WATER COMPANY THREATENED TO SHUT OFF SERVICE IN YOUR HOME?: ALREADY SHUT OFF
WITHIN THE PAST 12 MONTHS, YOU WORRIED THAT YOUR FOOD WOULD RUN OUT BEFORE YOU GOT THE MONEY TO BUY MORE: SOMETIMES TRUE
WITHIN THE PAST 12 MONTHS, THE FOOD YOU BOUGHT JUST DIDN'T LAST AND YOU DIDN'T HAVE MONEY TO GET MORE: SOMETIMES TRUE
WITHIN THE LAST YEAR, HAVE YOU BEEN KICKED, HIT, SLAPPED, OR OTHERWISE PHYSICALLY HURT BY YOUR PARTNER OR EX-PARTNER?: NO
WITHIN THE LAST YEAR, HAVE YOU BEEN AFRAID OF YOUR PARTNER OR EX-PARTNER?: NO
WITHIN THE LAST YEAR, HAVE YOU BEEN HUMILIATED OR EMOTIONALLY ABUSED IN OTHER WAYS BY YOUR PARTNER OR EX-PARTNER?: NO

## 2025-03-13 NOTE — PROGRESS NOTES
Patient arrived to unit at 2254.   Temp: 97.2f temporal  BP: 135/83  HR: 64  RR: 18  O2: 93 2L NC  Weight: 100kg    4 Eyes Skin Assessment Completed by CODY Rivers and CODY Quintero.    Head WDL  Ears Red but blanching   Nose WDL  Mouth WDL  Neck C collar in place, skin intact   Breast/Chest WDL  Shoulder Blades Redness and Blanching  Spine Redness and Blanching  (R) Arm/Elbow/Hand Bruising and Abrasion  (L) Arm/Elbow/Hand WDL  Abdomen Scar RLQ  Groin Redness and Blanching  Scrotum/Coccyx/Buttocks Redness and Blanching  (R) Leg Scar and Bruising  (L) Leg Bruising  (R) Heel/Foot/Toe WDL  (L) Heel/Foot/Toe Blood blister          Devices In Places Blood Pressure Cuff, Pulse Ox, SCD's, and Cervical Collar      Interventions In Place Gray Ear Foams, Sacral Mepilex, Pillows, and Q2 Turns    Possible Skin Injury No    Pictures Uploaded Into Epic N/A  Wound Consult Placed N/A  RN Wound Prevention Protocol Ordered No

## 2025-03-13 NOTE — ED NOTES
Bedside report given to TICU RN, pt transferring to T917/01 via gurney and on cardiac monitor with TICU RN. Pt A&Ox4, on room air, belongings within possession.

## 2025-03-13 NOTE — DISCHARGE PLANNING
[facility] Transitional Care Coordination     Referral from: Zara Stein  Insurance Provider on Facesheet: Christ  Potential Rehab Diagnosis: TSCI     Chart review indicates patient may have on going medical management and may have therapy needs to possibly meet inpatient rehab facility criteria with the goal of returning to community.     D/C support: TBD     Physiatry consultation pended, awaiting neurosurgical evaluation. TCC will continue to follow.     Thank you for the referral.

## 2025-03-13 NOTE — H&P
"TRAUMA HISTORY AND PHYSICAL    CHIEF COMPLAINT: Trauma green transfer concern for central cord injury.     HISTORY OF PRESENT ILLNESS: Ced Emerson is a very pleasant 49-year-old male the patientTrauma green transfer concern for central cord injury.  He presents awake alert and oriented.  Report fall struck by tarp while unloading hay.  He reports pain neck paresthesias arms hands legs.  He reports sense of numbness tingling unchanged at time of injury.  States no headache, no chest pain no abdominal pain no pain in his extremities.  He reports history of asthma last used inhaler approximately noon now breathing with ease.  Emergency department is consulted with neurosurgery    The patient was triaged as a Trauma Green Transfer in accordance with established pre hospital protocols. An expeditious primary and secondary survey with required adjuncts was conducted. See Trauma Narrator for full details.    PAST MEDICAL HISTORY:  has a past medical history of Asthma and Sleep apnea.     PAST SURGICAL HISTORY:  has a past surgical history that includes other abdominal surgery and muscle biopsy (Right, 6/27/2017).    ALLERGIES:   Allergies   Allergen Reactions    Compazine      Eyes roll up, tries to turn head around to back \"like exorcist\"      CURRENT MEDICATIONS:    Home Medications       Reviewed by Guille Dubose R.N. (Registered Nurse) on 03/12/25 at 2154  Med List Status: Not Addressed     Medication Last Dose Status   albuterol 108 (90 BASE) MCG/ACT Aero Soln inhalation aerosol  Active   gabapentin (NEURONTIN) 300 MG Cap  Active   ibuprofen (MOTRIN) 800 MG Tab  Active   meperidine (DEMEROL) 50 MG Tab  Active   omeprazole (PRILOSEC) 20 MG delayed-release capsule  Active                  FAMILY HISTORY: family history is not on file.    SOCIAL HISTORY:  reports that he has never smoked. He does not have any smokeless tobacco history on file. He reports that he does not drink alcohol and does not use " "drugs.    REVIEW OF SYSTEMS:  Is negative with the exception of the aforementioned details in the history of present illness, past medical history, and past surgical history in accordance with CMS guidelines.    PHYSICAL EXAMINATION:     CONSTITUTIONAL:     Vital Signs: /77   Pulse 65   Temp 37 °C (98.6 °F) (Temporal)   Resp 18   Ht 1.854 m (6' 1\")   Wt 102 kg (225 lb)   SpO2 95%    General Appearance: appears stated age, is in no apparent distress.  HEENT:    No facial tenderness no bleeding ears nose or mouth  NECK:    The cervical spine is immobilized with a collar. The trachea is midline. There is no jugulovenous distention or cervical crepitance.   RESPIRATORY:   Inspection: Unlabored respirations, no intercostal retractions, paradoxical motion, or accessory muscle use.   Palpation:  The chest is nontender.   CARDIOVASCULAR:   Skin warm palpable pulse  ABDOMEN:   Soft nontender nondistended no guarding no rebound  MUSCULOSKELETAL:   The pelvis is stable.  No significant angulation, deformity, or soft tissue injury involving the upper and lower extremities. Normal range of motion.   SKIN:    Appropriate color and temperature.  NEUROLOGIC:    GCS 15.  Friendly cooperative.  PSYCHIATRIC:   Appropriate mood and behavior.    LABORATORY VALUES:   Recent Labs     03/12/25  2151   WBC 8.2   RBC 5.66   HEMOGLOBIN 16.0   HEMATOCRIT 45.1   MCV 79.7*   MCH 28.3   MCHC 35.5   RDW 39.8   PLATELETCT 209   MPV 8.1*                    IMAGING:   CT-OUTSIDE IMAGES-CT CHEST   Final Result      CT-OUTSIDE IMAGES-CT HEAD   Final Result      CT-OUTSIDE IMAGES-CT ABDOMEN/PELVIS   Final Result      CT-OUTSIDE IMAGES-CT CERVICAL SPINE   Final Result      [id number] FILM CT   Final Result      GE-EFCCLOW-RFIEGEA FILM CT   Final Result      MR-OUTSIDE IMAGES-MR CERVICAL SPINE   Final Result      [id number] FILM CT   Final Result          IMPRESSION AND PLAN:     Active Hospital Problems    Diagnosis     " Central cord syndrome at C5 level of cervical spinal cord (HCC) [S14.125A]      Priority: High    Contraindication to deep vein thrombosis (DVT) prophylaxis [Z53.09]      Priority: Medium    Trauma [T14.90XA]      Priority: Low       DISPOSITION:  Trauma ICU.  Cervical spine precautions  Follow-up neurosurgical evaluation recommendations    Pain control  Provide encouragement and support monitor neurostatus and comfort level  Adjust medications and comfort measures as needed    Card  monitor for signs of bleeding  Continue to monitor to maintain adequate HR and BP  Follow up labs    Pulm  continue aggressive pulmonary hygiene  Encourage cough deep breath, IS  scd dvt prohylaxis    GI  N.p.o. pending completion evaluation   bowel regime  Monitor abdominal exan    Renal  IV hydration  Monitor urine output, fluid balance  Follow-up labs replace electrolytes as needed         CRITICAL CARE TIME: 32 minutes excluding procedures.  ____________________________________   Bernardino Aguirre M.D.    DD: 3/12/2025  10:20 PM

## 2025-03-13 NOTE — ASSESSMENT & PLAN NOTE
Central cord swelling without acute cervical fracture. C5 and C6 stenosis on CT scan.  Transient weakness immediately after the fall, with return of full strength. Persistent paresthesias since.   MRI completed, cord contusion.  Non-operative management.   3/14 Continues Hyperperfusion protocol, MAP > 85 x 5-days.  3/16 Off levophed.  Midodrine with hold for MAP > 100.   Paresthesias. Faint  /deltoid weakness at times  No bracing required.  Capo Teran MD. Neurosurgeon. [facility].  Physiatry consulted.

## 2025-03-13 NOTE — ED NOTES
Medication history reviewed with patient at bedside.   Med rec is complete  Allergies reviewed.     Patient has not had any outpatient antibiotics in the last 30 days.     Anticoagulants: No  Dispense history available in EPIC: Yes    Alysa Skinner

## 2025-03-13 NOTE — ASSESSMENT & PLAN NOTE
Ground level fall.   Transient weakness immediately after the fall. Persistent paresthesias since.  Trauma Green Transfer Activation from [facility] in [place], [place].  Bernardino Aguirre MD. Trauma Surgery.

## 2025-03-13 NOTE — ASSESSMENT & PLAN NOTE
VTE prophylaxis initially contraindicated secondary to elevated bleeding risk.  3/14 Trauma surveillance venous duplex ultrasonography ordered.  3/13 Lovenox initiated, approved by neurosurgery.

## 2025-03-13 NOTE — PROGRESS NOTES
Dr. Teran at bedside. Looked over MRI. Okay to remove C-collar and proceed with mobility. Continue with hyperperfusion at this time.

## 2025-03-13 NOTE — CARE PLAN
The patient is Unstable - High likelihood or risk of patient condition declining or worsening    Shift Goals  Clinical Goals: Q 1 hr neuro checks, spinal satbility, spinal precatuions  Patient Goals: figure out what plan is  Family Goals: updates    Progress made toward(s) clinical / shift goals:    Problem: Knowledge Deficit - Standard  Goal: Patient and family/care givers will demonstrate understanding of plan of care, disease process/condition, diagnostic tests and medications  Description: Target End Date:  1-3 days or as soon as patient condition allowsDocument in Patient Education1.  Patient and family/caregiver oriented to unit, equipment, visitation policy and means for communicating concern2.  Complete/review Learning Assessment3.  Assess knowledge level of disease process/condition, treatment plan, diagnostic tests and medications4.  Explain disease process/condition, treatment plan, diagnostic tests and medications  Outcome: Progressing     Problem: Pain - Standard  Goal: Alleviation of pain or a reduction in pain to the patient’s comfort goal  Description: Target End Date:  Prior to discharge or change in level of careDocument on Vitals flowsheet1.  Document pain using the appropriate pain scale per order or unit policy2.  Educate and implement non-pharmacologic comfort measures (i.e. relaxation, distraction, massage, cold/heat therapy, etc.)3.  Pain management medications as ordered4.  Reassess pain after pain med administration per policy5.  If opiods administered assess patient's response to pain medication is appropriate per POSS sedation scale6.  Follow pain management plan developed in collaboration with patient and interdisciplinary team (including palliative care or pain specialists if applicable)  Outcome: Progressing       Patient is not progressing towards the following goals:

## 2025-03-13 NOTE — CONSULTS
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 and his neck knocking himself out he said when he woke up he had hyperesthesias in the hands burning sensation that was quite bad overnight he had progressive symptoms of this and then subsequently this morning we we see that he is no longer 5 out of 5 strength he is approximately 3 to 4- out of 5 strength in all distributions he said his legs feel like dead weight and his arms are very difficult for him to lift up there more likely 4- out of 5 he can squeeze push pull all motor groups are functional he did have MRIs at [facility] As well as CT scans which had reads a fracture in disc that were causing compression we have evaluated these there is no fracture on the STIR imaging the facets are nicely aligned there is no ligamentous injury there is also clear evidence of CSF signal completely surrounding the cord at all levels they likely read the disc as the flow-voids that are present from the epidural veins these are not disc this is a vascular structure that is noncompressive we have reviewed this imaging with my colleague Dr. Richard who also agrees that there is no sign of concentric compression of the cord at any level this is likely a central cord injury even though there is very minimal's findings of this there is on the STIR imaging again some T2 signal change in the cord consistent with edema which is likely part of the central cord injury we would endorse moving forward with 5 days of MAP goals no surgical intervention and given the lack of any ligamentous or fractured history or findings on any of the imaging he can be cleared of the c-collar set up and then medically treated for this injury.    12 point review of systems as per HPI he had a loss of consciousness as well as progressive paresthesias and now weakness  Past medical history past surgical history medications  noncontributory  Physical examination this morning he is alert and orient x 3 he has a lot of pain secondary to this injury in terms of hyperesthesias as well as some neck pain  Motor examination he has 4 - to threes proximally in the bilateral upper extremities with hyperesthesia throughout and on the bilateral lower extremities he says they feel heavy and less sensitive and they are 3 out of 5 strength throughout this is a decline from yesterday.    MRI of the cervical spine and CT of the cervical spine show no evidence of fractures on STIR or on bone imaging additionally the MRIs do not show any circumferential compression at any level and on the STIR imaging there is some T2 signal in the cord approximately mid cervical spine correlating with the C5-6 C6-7 level but again there is no disc impingement on the cord there is no deformation and we do not see anything that is overtly surgical.    Assessment and plan  At this time this is a medical treatment he likely had the cord impact into the central canal causing a blunt force trauma to the cord this is a consistent with a central cord injury there is no disc or fracture that would need to be repaired at this time this has been reviewed with colleagues and they also agree that surgical decompression and fusion or disc replacement is not warranted at this time given the lack of compression this patient should be treated medically with MAP goals for 5 days  MAP greater than 85 for 5 days 105  Medical management per ICU  No role for surgical intervention  We will continue to follow until the patient has improvement or needs any form of intervention.  Given no surgery patient is okay for DVT prophylaxis    Total of 55 minutes in direct patient care coronation consultation valuation

## 2025-03-13 NOTE — PROGRESS NOTES
3700 paged neurosurgery about persisting numbness and tingling to patients upper and lower extremities. MD Teran stated continue current course of care, notify MD if patient motor strength becomes weaker.

## 2025-03-13 NOTE — THERAPY
03/13/25 0827   Interdisciplinary Plan of Care Collaboration   Collaboration Comments OT consult received. Pt pending neurosurgery consult for C5-6 fracture. Will hold and follow for POC.

## 2025-03-13 NOTE — PROGRESS NOTES
Trauma / Surgical Daily Progress Note    Date of Service  3/13/2025    Chief Complaint  49 y.o. male admitted 3/12/2025 with central cord syndrome and cervical stenosis at C 5-6.    Interval Events  Tertiary exam complete, no new injuries identified.  Very somnolent on exam.  Complaining of headache, nausea, KUB nml.  Hypocalcemia.  GCS 15.  Neurosurgery note reviewed.  Hyperperfusion ongoing.    - Try Fioricet for headache  - Mobilize as tolerated  - Aggressive respiratory hygiene  - Start Lovenox this evening  - Needs PDI complete  - Continue ICU for hyperperfusion and neuro checks    Review of Systems  Review of Systems   Constitutional:  Negative for chills and fever.   Respiratory:  Negative for cough and shortness of breath.    Genitourinary: Negative.    Musculoskeletal:  Positive for myalgias and neck pain.   Neurological:  Positive for tingling, sensory change, focal weakness and headaches.      Vital Signs  Temp:  [35.5 °C (95.9 °F)-37 °C (98.6 °F)] (P) 36.5 °C (97.7 °F)  Pulse:  [59-80] (P) 60  Resp:  [6-18] (P) 8  BP: (107-138)/(60-88) (P) 116/64  SpO2:  [76 %-97 %] (P) 95 %    Physical Exam  Physical Exam  Vitals and nursing note reviewed.   Constitutional:       General: He is awake. He is not in acute distress.     Appearance: Normal appearance. He is not ill-appearing or toxic-appearing.   HENT:      Head: Normocephalic.      Jaw: There is normal jaw occlusion.      Right Ear: External ear normal.      Left Ear: External ear normal.      Nose: Nose normal.      Mouth/Throat:      Mouth: Mucous membranes are moist.   Eyes:      General: No scleral icterus.        Right eye: No discharge.         Left eye: No discharge.      Pupils: Pupils are equal, round, and reactive to light.   Cardiovascular:      Rate and Rhythm: Normal rate and regular rhythm.      Pulses: Normal pulses.   Pulmonary:      Effort: Pulmonary effort is normal. No respiratory distress.      Breath sounds: Normal breath sounds.    Chest:      Chest wall: Tenderness present. No deformity or swelling.   Abdominal:      General: There is no distension.      Palpations: Abdomen is soft.      Tenderness: There is no abdominal tenderness.   Musculoskeletal:      Cervical back: Neck supple.      Comments: Moves upper extremities, 4/5 strength  Moves lower extremities 4-5/5 strength   Skin:     General: Skin is warm and dry.      Capillary Refill: Capillary refill takes less than 2 seconds.   Neurological:      Mental Status: He is alert and oriented to person, place, and time.      GCS: GCS eye subscore is 4. GCS verbal subscore is 5. GCS motor subscore is 6.      Sensory: Sensation is intact.      Motor: Weakness present.   Psychiatric:         Attention and Perception: Attention normal.         Mood and Affect: Mood normal.         Speech: Speech normal.         Behavior: Behavior is cooperative.         Thought Content: Thought content normal.         Cognition and Memory: Cognition normal.         Judgment: Judgment normal.         Laboratory  Recent Results (from the past 24 hours)   DIAGNOSTIC ALCOHOL    Collection Time: 03/12/25  9:51 PM   Result Value Ref Range    Diagnostic Alcohol <10.1 <10.1 mg/dL   CBC WITHOUT DIFFERENTIAL    Collection Time: 03/12/25  9:51 PM   Result Value Ref Range    WBC 8.2 4.8 - 10.8 K/uL    RBC 5.66 4.70 - 6.10 M/uL    Hemoglobin 16.0 14.0 - 18.0 g/dL    Hematocrit 45.1 42.0 - 52.0 %    MCV 79.7 (L) 81.4 - 97.8 fL    MCH 28.3 27.0 - 33.0 pg    MCHC 35.5 32.3 - 36.5 g/dL    RDW 39.8 35.9 - 50.0 fL    Platelet Count 209 164 - 446 K/uL    MPV 8.1 (L) 9.0 - 12.9 fL   Comp Metabolic Panel    Collection Time: 03/12/25  9:51 PM   Result Value Ref Range    Sodium 137 135 - 145 mmol/L    Potassium 3.9 3.6 - 5.5 mmol/L    Chloride 103 96 - 112 mmol/L    Co2 23 20 - 33 mmol/L    Anion Gap 11.0 7.0 - 16.0    Glucose 106 (H) 65 - 99 mg/dL    Bun 20 8 - 22 mg/dL    Creatinine 0.96 0.50 - 1.40 mg/dL    Calcium 9.2 8.5 - 10.5  mg/dL    Correct Calcium 8.9 8.5 - 10.5 mg/dL    AST(SGOT) 24 12 - 45 U/L    ALT(SGPT) 31 2 - 50 U/L    Alkaline Phosphatase 104 (H) 30 - 99 U/L    Total Bilirubin 1.3 0.1 - 1.5 mg/dL    Albumin 4.4 3.2 - 4.9 g/dL    Total Protein 6.8 6.0 - 8.2 g/dL    Globulin 2.4 1.9 - 3.5 g/dL    A-G Ratio 1.8 g/dL   Prothrombin Time    Collection Time: 25  9:51 PM   Result Value Ref Range    PT 14.1 12.0 - 14.6 sec    INR 1.09 0.87 - 1.13   APTT    Collection Time: 25  9:51 PM   Result Value Ref Range    APTT 30.4 24.7 - 36.0 sec   COD - Adult (Type and Screen)    Collection Time: 25  9:51 PM   Result Value Ref Range    ABO Grouping Only O     Rh Grouping Only POS     Antibody Screen-Cod NEG    ESTIMATED GFR    Collection Time: 25  9:51 PM   Result Value Ref Range    GFR (CKD-EPI) 95 >60 mL/min/1.73 m 2   EKG    Collection Time: 25 10:41 PM   Result Value Ref Range    Report       [facility] Emergency Dept.    Test Date:  2025  Pt Name:    ZANA TELLEZ                  Department: ER  MRN:        [id number]                      Room:       [facility]  Gender:     Male                         Technician: [id number]  :        1975                   Requested By:ANDREI BLACK  Order #:    [id number]                    Reading MD:    Measurements  Intervals                                Axis  Rate:       63                           P:          29  UT:         155                          QRS:        77  QRSD:       107                          T:          44  QT:         445  QTc:        456    Interpretive Statements  Sinus rhythm  Borderline T wave abnormalities  Baseline wander in lead(s) V2  Compared to ECG 2016 04:37:43  T-wave abnormality now present     ABO Rh Confirm    Collection Time: 25  3:54 AM   Result Value Ref Range    ABO Rh Confirm O POS    CBC with Differential: Tomorrow AM    Collection Time: 25  3:54 AM   Result Value Ref Range    WBC 6.9  4.8 - 10.8 K/uL    RBC 5.44 4.70 - 6.10 M/uL    Hemoglobin 15.2 14.0 - 18.0 g/dL    Hematocrit 44.6 42.0 - 52.0 %    MCV 82.0 81.4 - 97.8 fL    MCH 27.9 27.0 - 33.0 pg    MCHC 34.1 32.3 - 36.5 g/dL    RDW 40.5 35.9 - 50.0 fL    Platelet Count 207 164 - 446 K/uL    MPV 8.1 (L) 9.0 - 12.9 fL    Neutrophils-Polys 72.00 44.00 - 72.00 %    Lymphocytes 18.60 (L) 22.00 - 41.00 %    Monocytes 8.10 0.00 - 13.40 %    Eosinophils 0.60 0.00 - 6.90 %    Basophils 0.40 0.00 - 1.80 %    Immature Granulocytes 0.30 0.00 - 0.90 %    Nucleated RBC 0.00 0.00 - 0.20 /100 WBC    Neutrophils (Absolute) 4.98 1.82 - 7.42 K/uL    Lymphs (Absolute) 1.29 1.00 - 4.80 K/uL    Monos (Absolute) 0.56 0.00 - 0.85 K/uL    Eos (Absolute) 0.04 0.00 - 0.51 K/uL    Baso (Absolute) 0.03 0.00 - 0.12 K/uL    Immature Granulocytes (abs) 0.02 0.00 - 0.11 K/uL    NRBC (Absolute) 0.00 K/uL   Comp Metabolic Panel (CMP): Tomorrow AM    Collection Time: 03/13/25  3:54 AM   Result Value Ref Range    Sodium 138 135 - 145 mmol/L    Potassium 4.3 3.6 - 5.5 mmol/L    Chloride 106 96 - 112 mmol/L    Co2 21 20 - 33 mmol/L    Anion Gap 11.0 7.0 - 16.0    Glucose 96 65 - 99 mg/dL    Bun 20 8 - 22 mg/dL    Creatinine 0.92 0.50 - 1.40 mg/dL    Calcium 8.2 (L) 8.5 - 10.5 mg/dL    Correct Calcium 8.3 (L) 8.5 - 10.5 mg/dL    AST(SGOT) 21 12 - 45 U/L    ALT(SGPT) 25 2 - 50 U/L    Alkaline Phosphatase 94 30 - 99 U/L    Total Bilirubin 1.3 0.1 - 1.5 mg/dL    Albumin 3.9 3.2 - 4.9 g/dL    Total Protein 6.2 6.0 - 8.2 g/dL    Globulin 2.3 1.9 - 3.5 g/dL    A-G Ratio 1.7 g/dL   Magnesium: Every Monday and Thursday AM    Collection Time: 03/13/25  3:54 AM   Result Value Ref Range    Magnesium 1.8 1.5 - 2.5 mg/dL   Phosphorus: Every Monday and Thursday AM    Collection Time: 03/13/25  3:54 AM   Result Value Ref Range    Phosphorus 2.8 2.5 - 4.5 mg/dL   ESTIMATED GFR    Collection Time: 03/13/25  3:54 AM   Result Value Ref Range    GFR (CKD-EPI) 100 >60 mL/min/1.73 m 2   POCT glucose  device results    Collection Time: 03/13/25  5:37 AM   Result Value Ref Range    POC Glucose, Blood 124 (H) 65 - 99 mg/dL   POCT glucose device results    Collection Time: 03/13/25  1:21 PM   Result Value Ref Range    POC Glucose, Blood 117 (H) 65 - 99 mg/dL       Fluids    Intake/Output Summary (Last 24 hours) at 3/13/2025 1409  Last data filed at 3/13/2025 1330  Gross per 24 hour   Intake 1587.06 ml   Output 1050 ml   Net 537.06 ml       Core Measures & Quality Metrics  Labs reviewed, Radiology images reviewed and Medications reviewed  Gann catheter: No Gann      DVT Prophylaxis: Enoxaparin (Lovenox)  DVT prophylaxis - mechanical: SCDs      Assessed for rehab: Patient was assess for and/or received rehabilitation services during this hospitalization    RAP Score Total: 2    CAGE Results: negative Blood Alcohol>0.08: no       Assessment/Plan  * Trauma- (present on admission)  Assessment & Plan  Ground level fall.   Transient weakness immediately after the fall. Persistent paresthesias since.  Trauma Green Transfer Activation from [facility] in [place], [place].  Bernardino Aguirre MD. Trauma Surgery.    Central cord syndrome at C5 level of cervical spinal cord (HCC)- (present on admission)  Assessment & Plan  Central cord swelling without acute cervical fracture. C5 and C6 stenosis on CT scan.  Transient weakness immediately after the fall, with return of full strength. Persistent paresthesias since.   MRI completed.  Non-operative management.   Hyperperfusion protocol.  No bracing required.  Capo Teran MD. Neurosurgeon. [facility].    No contraindication to deep vein thrombosis (DVT) prophylaxis- (present on admission)  Assessment & Plan  VTE prophylaxis initially contraindicated secondary to elevated bleeding risk.  3/14 Trauma surveillance venous duplex ultrasonography ordered.  3/13 Lovenox initiated, approved by NS.    Mental status adequate for full examination?: Yes    Spine  cleared (radiologically and/or clinically): Yes    All current laboratory studies/radiology exams reviewed: Yes    Medications reconciliation has been reviewed: Yes    Completed Consultations:  Neurosurgery     Pending Consultations:  Physiatry    Newly identified diagnoses, injuries and/or co-morbidities:  None at this time    PDI Not complete at the time of tertiary    Discussed patient condition with RN, Patient, and trauma surgery.

## 2025-03-13 NOTE — ED NOTES
Patient presents to ER by [facility]  Patient is a 49 year old     Activated as Trauma Green for: C5-C6 fx after being thrown after being hit by tarp. 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    Medications Prior to Arrival: 2mg zofran, 2morphine, 15mg toradol  Safety Devices in place: C-collar  Patient is A&Ox4  GCS:15
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
## 2025-03-13 NOTE — DISCHARGE PLANNING
NOK/Emergency Contact is Pt's wife, Cathryn [phone] No ACP Docs    Met with Pt at bedside. Pt lives with his wife and two adult children in a manufactured type home in [place]. There are three entry steps. He denies any Hx of alcohol or illicit drug use. No MH Dx. He is independent with all ADL/IADLs, and drives. Pt works as a  for a [employer] company. All required WC forms have been completed.     Pt's wife can provide transportation back to [place] on discharge.     Care Transition Team Assessment    Information Source  Orientation Level: (P) Oriented X4  Information Given By: Patient  Who is responsible for making decisions for patient? : Patient    Readmission Evaluation  Is this a readmission?: No    Elopement Risk  Legal Hold: (P) No  Ambulatory or Self Mobile in Wheelchair: (P) No-Not an Elopement Risk  Elopement Risk: (P) Not at Risk for Elopement    Interdisciplinary Discharge Planning  Lives with - Patient's Self Care Capacity: Significant Other  Support Systems: Family Member(s)    Discharge Preparedness  What is your plan after discharge?: Uncertain - pending medical team collaboration  What are your discharge supports?: Spouse, Child  Prior Functional Level: Ambulatory, Drives Self, Independent with Activities of Daily Living, Independent with Medication Management  Difficulity with ADLs: None  Difficulity with IADLs: None    Functional Assesment  Prior Functional Level: Ambulatory, Drives Self, Independent with Activities of Daily Living, Independent with Medication Management    Finances  Financial Barriers to Discharge: No  Prescription Coverage: Yes    Advance Directive  Advance Directive?: None  Advance Directive offered?: AD Booklet refused    Domestic Abuse  Have you ever been the victim of abuse or violence?: No    Psychological Assessment  History of Substance Abuse: None  History of Psychiatric Problems: No  Non-compliant with Treatment: No  Newly  Diagnosed Illness: Yes    Discharge Risks or Barriers  Discharge risks or barriers?: No  Patient risk factors: Cognitive / sensory / physical deficit    Anticipated Discharge Information  Discharge Disposition: Discharged to home/self care (01)  Discharge Address: Home ([address] [place] [id number])  Discharge Contact Phone Number: Rachelle ()

## 2025-03-13 NOTE — ED NOTES
Report received from CODY Samuels. Pt A&Ox4, on room air, aspen collar in place. Pt updated on ER process, verbalized understanding.
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
## 2025-03-14 LAB
ALBUMIN SERPL BCP-MCNC: 3.7 G/DL (ref 3.2–4.9)
ALBUMIN/GLOB SERPL: 1.6 G/DL
ALP SERPL-CCNC: 90 U/L (ref 30–99)
ALT SERPL-CCNC: 26 U/L (ref 2–50)
ANION GAP SERPL CALC-SCNC: 10 MMOL/L (ref 7–16)
AST SERPL-CCNC: 22 U/L (ref 12–45)
BASOPHILS # BLD AUTO: 0.5 % (ref 0–1.8)
BASOPHILS # BLD: 0.03 K/UL (ref 0–0.12)
BILIRUB SERPL-MCNC: 1.2 MG/DL (ref 0.1–1.5)
BUN SERPL-MCNC: 14 MG/DL (ref 8–22)
CALCIUM ALBUM COR SERPL-MCNC: 8.7 MG/DL (ref 8.5–10.5)
CALCIUM SERPL-MCNC: 8.5 MG/DL (ref 8.5–10.5)
CHLORIDE SERPL-SCNC: 108 MMOL/L (ref 96–112)
CO2 SERPL-SCNC: 22 MMOL/L (ref 20–33)
CREAT SERPL-MCNC: 0.94 MG/DL (ref 0.5–1.4)
EOSINOPHIL # BLD AUTO: 0.1 K/UL (ref 0–0.51)
EOSINOPHIL NFR BLD: 1.8 % (ref 0–6.9)
ERYTHROCYTE [DISTWIDTH] IN BLOOD BY AUTOMATED COUNT: 42.2 FL (ref 35.9–50)
GFR SERPLBLD CREATININE-BSD FMLA CKD-EPI: 98 ML/MIN/1.73 M 2
GLOBULIN SER CALC-MCNC: 2.3 G/DL (ref 1.9–3.5)
GLUCOSE SERPL-MCNC: 104 MG/DL (ref 65–99)
HCT VFR BLD AUTO: 41.6 % (ref 42–52)
HGB BLD-MCNC: 14.5 G/DL (ref 14–18)
IMM GRANULOCYTES # BLD AUTO: 0.02 K/UL (ref 0–0.11)
IMM GRANULOCYTES NFR BLD AUTO: 0.4 % (ref 0–0.9)
LYMPHOCYTES # BLD AUTO: 1.74 K/UL (ref 1–4.8)
LYMPHOCYTES NFR BLD: 31.5 % (ref 22–41)
MCH RBC QN AUTO: 28.3 PG (ref 27–33)
MCHC RBC AUTO-ENTMCNC: 34.9 G/DL (ref 32.3–36.5)
MCV RBC AUTO: 81.3 FL (ref 81.4–97.8)
MONOCYTES # BLD AUTO: 0.64 K/UL (ref 0–0.85)
MONOCYTES NFR BLD AUTO: 11.6 % (ref 0–13.4)
NEUTROPHILS # BLD AUTO: 3 K/UL (ref 1.82–7.42)
NEUTROPHILS NFR BLD: 54.2 % (ref 44–72)
NRBC # BLD AUTO: 0 K/UL
NRBC BLD-RTO: 0 /100 WBC (ref 0–0.2)
PLATELET # BLD AUTO: 193 K/UL (ref 164–446)
PMV BLD AUTO: 8.1 FL (ref 9–12.9)
POTASSIUM SERPL-SCNC: 4.3 MMOL/L (ref 3.6–5.5)
PROT SERPL-MCNC: 6 G/DL (ref 6–8.2)
RBC # BLD AUTO: 5.12 M/UL (ref 4.7–6.1)
SODIUM SERPL-SCNC: 140 MMOL/L (ref 135–145)
WBC # BLD AUTO: 5.5 K/UL (ref 4.8–10.8)

## 2025-03-14 PROCEDURE — 700111 HCHG RX REV CODE 636 W/ 250 OVERRIDE (IP): Performed by: PHYSICIAN ASSISTANT

## 2025-03-14 PROCEDURE — A9270 NON-COVERED ITEM OR SERVICE: HCPCS | Performed by: NURSE PRACTITIONER

## 2025-03-14 PROCEDURE — 99223 1ST HOSP IP/OBS HIGH 75: CPT | Performed by: PHYSICAL MEDICINE & REHABILITATION

## 2025-03-14 PROCEDURE — 99291 CRITICAL CARE FIRST HOUR: CPT | Performed by: SURGERY

## 2025-03-14 PROCEDURE — 85025 COMPLETE CBC W/AUTO DIFF WBC: CPT

## 2025-03-14 PROCEDURE — 97166 OT EVAL MOD COMPLEX 45 MIN: CPT

## 2025-03-14 PROCEDURE — 770022 HCHG ROOM/CARE - ICU (200)

## 2025-03-14 PROCEDURE — 700105 HCHG RX REV CODE 258: Performed by: NURSE PRACTITIONER

## 2025-03-14 PROCEDURE — 80053 COMPREHEN METABOLIC PANEL: CPT

## 2025-03-14 PROCEDURE — A9270 NON-COVERED ITEM OR SERVICE: HCPCS | Performed by: PHYSICIAN ASSISTANT

## 2025-03-14 PROCEDURE — 700102 HCHG RX REV CODE 250 W/ 637 OVERRIDE(OP): Performed by: PHYSICIAN ASSISTANT

## 2025-03-14 PROCEDURE — 700102 HCHG RX REV CODE 250 W/ 637 OVERRIDE(OP): Performed by: NURSE PRACTITIONER

## 2025-03-14 PROCEDURE — 97530 THERAPEUTIC ACTIVITIES: CPT

## 2025-03-14 PROCEDURE — 97162 PT EVAL MOD COMPLEX 30 MIN: CPT

## 2025-03-14 PROCEDURE — 700111 HCHG RX REV CODE 636 W/ 250 OVERRIDE (IP): Mod: JZ | Performed by: NURSE PRACTITIONER

## 2025-03-14 RX ORDER — GABAPENTIN 300 MG/1
300 CAPSULE ORAL EVERY 8 HOURS
Status: DISCONTINUED | OUTPATIENT
Start: 2025-03-14 | End: 2025-03-18 | Stop reason: HOSPADM

## 2025-03-14 RX ADMIN — OMEPRAZOLE 20 MG: 20 CAPSULE, DELAYED RELEASE ORAL at 05:41

## 2025-03-14 RX ADMIN — METAXALONE 800 MG: 800 TABLET ORAL at 05:41

## 2025-03-14 RX ADMIN — FAMOTIDINE 20 MG: 10 INJECTION, SOLUTION INTRAVENOUS at 05:41

## 2025-03-14 RX ADMIN — OXYCODONE HYDROCHLORIDE 10 MG: 10 TABLET ORAL at 23:56

## 2025-03-14 RX ADMIN — GABAPENTIN 100 MG: 100 CAPSULE ORAL at 05:41

## 2025-03-14 RX ADMIN — SODIUM CHLORIDE: 9 INJECTION, SOLUTION INTRAVENOUS at 06:06

## 2025-03-14 RX ADMIN — METAXALONE 800 MG: 800 TABLET ORAL at 17:16

## 2025-03-14 RX ADMIN — ACETAMINOPHEN 1000 MG: 500 TABLET ORAL at 22:11

## 2025-03-14 RX ADMIN — DOCUSATE SODIUM 100 MG: 100 CAPSULE, LIQUID FILLED ORAL at 17:16

## 2025-03-14 RX ADMIN — ENOXAPARIN SODIUM 30 MG: 100 INJECTION SUBCUTANEOUS at 05:41

## 2025-03-14 RX ADMIN — GABAPENTIN 300 MG: 300 CAPSULE ORAL at 14:12

## 2025-03-14 RX ADMIN — ACETAMINOPHEN 1000 MG: 500 TABLET ORAL at 14:11

## 2025-03-14 RX ADMIN — MIDODRINE HYDROCHLORIDE 5 MG: 5 TABLET ORAL at 14:12

## 2025-03-14 RX ADMIN — ENOXAPARIN SODIUM 30 MG: 100 INJECTION SUBCUTANEOUS at 17:17

## 2025-03-14 RX ADMIN — GABAPENTIN 300 MG: 300 CAPSULE ORAL at 22:11

## 2025-03-14 RX ADMIN — ACETAMINOPHEN 1000 MG: 500 TABLET ORAL at 05:41

## 2025-03-14 RX ADMIN — METAXALONE 800 MG: 800 TABLET ORAL at 11:53

## 2025-03-14 RX ADMIN — OXYCODONE HYDROCHLORIDE 5 MG: 5 TABLET ORAL at 19:45

## 2025-03-14 RX ADMIN — MIDODRINE HYDROCHLORIDE 5 MG: 5 TABLET ORAL at 05:41

## 2025-03-14 RX ADMIN — MIDODRINE HYDROCHLORIDE 5 MG: 5 TABLET ORAL at 22:11

## 2025-03-14 ASSESSMENT — COGNITIVE AND FUNCTIONAL STATUS - GENERAL
MOBILITY SCORE: 18
DAILY ACTIVITIY SCORE: 17
MOVING FROM LYING ON BACK TO SITTING ON SIDE OF FLAT BED: A LITTLE
SUGGESTED CMS G CODE MODIFIER DAILY ACTIVITY: CK
CLIMB 3 TO 5 STEPS WITH RAILING: A LITTLE
TURNING FROM BACK TO SIDE WHILE IN FLAT BAD: A LITTLE
DRESSING REGULAR LOWER BODY CLOTHING: A LITTLE
PERSONAL GROOMING: A LITTLE
DRESSING REGULAR UPPER BODY CLOTHING: A LOT
TOILETING: A LITTLE
EATING MEALS: A LITTLE
SUGGESTED CMS G CODE MODIFIER MOBILITY: CK
STANDING UP FROM CHAIR USING ARMS: A LITTLE
WALKING IN HOSPITAL ROOM: A LITTLE
MOVING TO AND FROM BED TO CHAIR: A LITTLE
HELP NEEDED FOR BATHING: A LITTLE

## 2025-03-14 ASSESSMENT — PAIN DESCRIPTION - PAIN TYPE
TYPE: ACUTE PAIN

## 2025-03-14 ASSESSMENT — GAIT ASSESSMENTS
GAIT LEVEL OF ASSIST: CONTACT GUARD ASSIST
DISTANCE (FEET): 250

## 2025-03-14 ASSESSMENT — ACTIVITIES OF DAILY LIVING (ADL): TOILETING: INDEPENDENT
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
## 2025-03-14 NOTE — CARE PLAN
The patient is Stable - Low risk of patient condition declining or worsening    Shift Goals  Clinical Goals: pulmonary hygiene, MAP >85  Patient Goals: IS  Family Goals: updates      Problem: Pain - Standard  Goal: Alleviation of pain or a reduction in pain to the patient’s comfort goal  Description: Target End Date:  Prior to discharge or change in level of careDocument on Vitals flowsheet1.  Document pain using the appropriate pain scale per order or unit policy2.  Educate and implement non-pharmacologic comfort measures (i.e. relaxation, distraction, massage, cold/heat therapy, etc.)3.  Pain management medications as ordered4.  Reassess pain after pain med administration per policy5.  If opiods administered assess patient's response to pain medication is appropriate per POSS sedation scale6.  Follow pain management plan developed in collaboration with patient and interdisciplinary team (including palliative care or pain specialists if applicable)  Outcome: Progressing

## 2025-03-14 NOTE — PROGRESS NOTES
Neurosurgery Progress Note    Subjective:  No acute events over night  Feeling stronger but extremities feels heavy    Exam:  Sitting up in chair eating break  BUE 5/5 except deltoids 4+/5  BLE 5/5      BP  Min: 98/62  Max: 153/87  Pulse  Av.1  Min: 51  Max: 84  Resp  Av.3  Min: 7  Max: 30  Temp  Av.6 °C (97.9 °F)  Min: 36.4 °C (97.5 °F)  Max: 36.8 °C (98.2 °F)  SpO2  Av.9 %  Min: 88 %  Max: 96 %    No data recorded    Recent Labs     25  0354 257   WBC 8.2 6.9 5.5   RBC 5.66 5.44 5.12   HEMOGLOBIN 16.0 15.2 14.5   HEMATOCRIT 45.1 44.6 41.6*   MCV 79.7* 82.0 81.3*   MCH 28.3 27.9 28.3   MCHC 35.5 34.1 34.9   RDW 39.8 40.5 42.2   PLATELETCT 209 207 193   MPV 8.1* 8.1* 8.1*     Recent Labs     25  0354 257   SODIUM 137 138 140   POTASSIUM 3.9 4.3 4.3   CHLORIDE 103 106 108   CO2 23 21 22   GLUCOSE 106* 96 104*   BUN 20 20 14   CREATININE 0.96 0.92 0.94   CALCIUM 9.2 8.2* 8.5     Recent Labs     25   APTT 30.4   INR 1.09           Intake/Output                         25 0700 - 25 0659 25 0700 - 03/15/25 0659      Total  Total                 Intake    P.O.  360  -- 360  --  -- --    P.O. 360 -- 360 -- -- --    I.V.  1409.7  1258.1 2667.8  201.6  -- 201.6    Magnesium Sulfate Volume 37.4 -- 37.4 -- -- --    Norepinephrine Volume 110.8 58.1 168.9 8.4 -- 8.4    Volume (mL) (NS infusion) 1261.4 1200 2461.4 193.2 -- 193.2    Other  270  -- 270  --  -- --    Medications (PO/Enteral Liquids) 270 -- 270 -- -- --    IV Piggyback  111  -- 111  --  -- --    Volume (mL) (calcium CHLORIDE 10 % 1,000 mg in dextrose 5% 100 mL IVPB) 111 -- 111 -- -- --    Total Intake 2150.6 1258.1 3408.7 201.6 -- 201.6       Output    Urine  825  1425 2250  --  -- --    Number of Times Voided 4 x 2 x 6 x -- -- --    Urine Void (mL) 825 1425 2250 -- -- --    Emesis  400  -- 400  --  -- --    Emesis  400 -- 400 -- -- --    Stool  --  -- --  --  -- --    Number of Times Stooled -- 2 x 2 x 1 x -- 1 x    Total Output 1225 1425 2650 -- -- --       Net I/O     925.6 -166.9 758.7 201.6 -- 201.6              Intake/Output Summary (Last 24 hours) at 3/14/2025 1050  Last data filed at 3/14/2025 0800  Gross per 24 hour   Intake 2827.09 ml   Output 2650 ml   Net 177.09 ml             gabapentin  300 mg Q8HRS    albuterol  2 Puff Q6HRS PRN    omeprazole  20 mg DAILY    acetaminophen  1,000 mg Q8HRS    Followed by    [START ON 3/17/2025] acetaminophen  1,000 mg Q8HRS PRN    enoxaparin (LOVENOX) injection  30 mg Q12HRS    Respiratory Therapy Consult   Continuous RT    Pharmacy Consult Request  1 Each PHARMACY TO DOSE    ondansetron  4 mg Q4HRS PRN    Or    ondansetron  4 mg Q4HRS PRN    docusate sodium  100 mg BID    senna-docusate  1 Tablet Nightly    senna-docusate  1 Tablet Q24HRS PRN    polyethylene glycol/lytes  1 Packet BID    magnesium hydroxide  30 mL DAILY AT 1800    bisacodyl  10 mg Q24HRS PRN    sodium phosphate  1 Enema Once PRN    NS   Continuous    oxyCODONE immediate-release  5 mg Q3HRS PRN    Or    oxyCODONE immediate-release  10 mg Q3HRS PRN    Or    HYDROmorphone  0.5 mg Q3HRS PRN    metaxalone  800 mg TID    NORepinephrine  0-1 mcg/kg/min (Ideal) Continuous    midodrine  5 mg Q8HR       Assessment and Plan:  Hospital day #2  POD #na  Prophylactic anticoagulation: yes             Central cord injury  MAP >85 for 5 day, day 2/5, on levo  Strength improving  No need for cervical collar  Q4hr neuro checks

## 2025-03-14 NOTE — DISCHARGE PLANNING
Case Management Discharge Planning    Admission Date: 3/12/2025  GMLOS: 2.6  ALOS: 2    6-Clicks ADL Score: 18  6-Clicks Mobility Score: 18      Anticipated Discharge Dispo: Discharge Disposition: Discharged to home/self care (01)  Discharge Address: Home ([address] [place] [id number])  Discharge Contact Phone Number: Rachelle ()    Assigned Workers Comp CM:  Jay Agudelo P: [phone] F: [phone]  [email or web address]    CM is available for any discharge needs. SW faxed C4 on file as requested by CM.
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
## 2025-03-14 NOTE — CARE PLAN
Problem: Skin Integrity  Goal: Skin integrity is maintained or improved  Outcome: Progressing     Problem: Pain - Standard  Goal: Alleviation of pain or a reduction in pain to the patient’s comfort goal  Outcome: Progressing     Problem: Knowledge Deficit - Standard  Goal: Patient and family/care givers will demonstrate understanding of plan of care, disease process/condition, diagnostic tests and medications  Outcome: Progressing   The patient is Watcher - Medium risk of patient condition declining or worsening    Shift Goals  Clinical Goals: Q 1 hr neuro checks, spinal satbility, spinal precatuions  Patient Goals: figure out what plan is  Family Goals: updates    Progress made toward(s) clinical / shift goals:  yes      Patient is not progressing towards the following goals:

## 2025-03-14 NOTE — DISCHARGE PLANNING
0825: PMR consult pended, awaiting therapy evals. TCC will continue to follow.    1228: PMR to consult.

## 2025-03-14 NOTE — PROGRESS NOTES
"    INTERVAL EVENTS AND INTERVENTIONS:   New admission to ICU overnight after transfer from outside facility with concern for spinal cord injury. Imaging reviewed with Dr Teran this morning, no fracture or ligamentous injury, cord contusion. Plan for hyperperfusion therapy for 5-days, ok for DVT prophylaxis, no plans for operative intervention.     The patient is critically injured with spinal cord injury.  The patient was seen and examined on rounds and discussed with the multidisciplinary critical care team and consulting physicians. Critically evaluated laboratory tests, culture data, medications, imaging, and other diagnostic tests.    The patient has acute impairment of one or more vital organ systems and a high probability of imminent or life-threatening deterioration in condition. Provided high complexity decision making to assess, manipulate, and support vital system functions to treat vital organ system failure and/or to prevent further life-threatening deterioration of the patient's condition. Requires continued ICU management and hospital admission. Paresthesias improving but still present.    Critical care interventions include: integration of multiple data points and associated complex medical decision making, titration of vasopressors, and management of thrombotic surveillance and risk mitigation.    PHYSICAL EXAMINATION:      Vital Signs: /67   Pulse 73   Temp 36.7 °C (98.1 °F) (Temporal)   Resp (!) 10   Ht 1.854 m (6' 1\")   Wt 102 kg (225 lb)   SpO2 95%   Physical Exam  Vitals and nursing note reviewed.   Constitutional:       General: He is not in acute distress.     Appearance: He is not toxic-appearing.   HENT:      Head: Normocephalic and atraumatic.      Right Ear: External ear normal.      Left Ear: External ear normal.      Nose: Nose normal.      Mouth/Throat:      Mouth: Mucous membranes are moist.      Pharynx: Oropharynx is clear.   Eyes:      General: No scleral icterus.     " Pupils: Pupils are equal, round, and reactive to light.   Cardiovascular:      Rate and Rhythm: Normal rate and regular rhythm.   Pulmonary:      Effort: Pulmonary effort is normal. No respiratory distress.   Abdominal:      General: There is no distension.      Palpations: Abdomen is soft.      Tenderness: There is no abdominal tenderness. There is no guarding or rebound.   Genitourinary:     Comments: Pelvis stable.  Musculoskeletal:      Cervical back: Neck supple.   Skin:     General: Skin is warm and dry.      Capillary Refill: Capillary refill takes less than 2 seconds.      Coloration: Skin is not jaundiced.   Neurological:      Mental Status: He is alert.      GCS: GCS eye subscore is 4. GCS verbal subscore is 5. GCS motor subscore is 6.      Motor: Weakness present.      Comments: Bilateral upper extremity strength 4/5, bilateral lower extremity strength 4-5/5. Paraesthesias distal to elbow in bilateral upper extremities and distal to knees in bilateral lower extremities.       LABORATORY VALUES AND IMAGING REVIEWED        ASSESSMENT AND PLAN:   * Trauma- (present on admission)  Assessment & Plan  Ground level fall.   Transient weakness immediately after the fall. Persistent paresthesias since.  Trauma Green Transfer Activation from [facility] in [place], [place].  Bernardino Aguirre MD. Trauma Surgery.    Central cord syndrome at C5 level of cervical spinal cord (HCC)- (present on admission)  Assessment & Plan  Central cord swelling without acute cervical fracture. C5 and C6 stenosis on CT scan.  Transient weakness immediately after the fall, with return of full strength. Persistent paresthesias since.   MRI completed, cord contusion.  Non-operative management.   Hyperperfusion protocol, MAP > 85 x 5-days.  No bracing required.  Capo Teran MD. Neurosurgeon. [facility].  Physiatry consulted.    Acid reflux- (present on admission)  Assessment & Plan  Chronic condition treated  with omeprazole.  Resumed maintenance medication on admission.    Asthma- (present on admission)  Assessment & Plan  Chronic condition treated with albuterol.  Resumed maintenance medication on admission.    No contraindication to deep vein thrombosis (DVT) prophylaxis- (present on admission)  Assessment & Plan  VTE prophylaxis initially contraindicated secondary to elevated bleeding risk.  3/14 Trauma surveillance venous duplex ultrasonography ordered.  3/13 Lovenox initiated, approved by neurosurgery.          CRITICAL CARE TIME: My full attention was spent providing medically necessary critical care to the patient, exclusive of time spent on any procedures, for 36 minutes, with details documented in my note.       ____________________________________     Toby George M.D.    DD: 3/13/2025  5:02 PM

## 2025-03-15 LAB
ALBUMIN SERPL BCP-MCNC: 3.8 G/DL (ref 3.2–4.9)
ALBUMIN/GLOB SERPL: 1.6 G/DL
ALP SERPL-CCNC: 93 U/L (ref 30–99)
ALT SERPL-CCNC: 35 U/L (ref 2–50)
ANION GAP SERPL CALC-SCNC: 10 MMOL/L (ref 7–16)
AST SERPL-CCNC: 28 U/L (ref 12–45)
BASOPHILS # BLD AUTO: 0.6 % (ref 0–1.8)
BASOPHILS # BLD: 0.04 K/UL (ref 0–0.12)
BILIRUB SERPL-MCNC: 0.9 MG/DL (ref 0.1–1.5)
BUN SERPL-MCNC: 13 MG/DL (ref 8–22)
CALCIUM ALBUM COR SERPL-MCNC: 9.3 MG/DL (ref 8.5–10.5)
CALCIUM SERPL-MCNC: 9.1 MG/DL (ref 8.5–10.5)
CHLORIDE SERPL-SCNC: 104 MMOL/L (ref 96–112)
CO2 SERPL-SCNC: 25 MMOL/L (ref 20–33)
CREAT SERPL-MCNC: 1.01 MG/DL (ref 0.5–1.4)
EKG IMPRESSION: NORMAL
EOSINOPHIL # BLD AUTO: 0.14 K/UL (ref 0–0.51)
EOSINOPHIL NFR BLD: 2.2 % (ref 0–6.9)
ERYTHROCYTE [DISTWIDTH] IN BLOOD BY AUTOMATED COUNT: 43 FL (ref 35.9–50)
GFR SERPLBLD CREATININE-BSD FMLA CKD-EPI: 90 ML/MIN/1.73 M 2
GLOBULIN SER CALC-MCNC: 2.4 G/DL (ref 1.9–3.5)
GLUCOSE SERPL-MCNC: 88 MG/DL (ref 65–99)
HCT VFR BLD AUTO: 43.6 % (ref 42–52)
HGB BLD-MCNC: 14.9 G/DL (ref 14–18)
IMM GRANULOCYTES # BLD AUTO: 0.02 K/UL (ref 0–0.11)
IMM GRANULOCYTES NFR BLD AUTO: 0.3 % (ref 0–0.9)
LYMPHOCYTES # BLD AUTO: 2.29 K/UL (ref 1–4.8)
LYMPHOCYTES NFR BLD: 36.5 % (ref 22–41)
MCH RBC QN AUTO: 28.7 PG (ref 27–33)
MCHC RBC AUTO-ENTMCNC: 34.2 G/DL (ref 32.3–36.5)
MCV RBC AUTO: 84 FL (ref 81.4–97.8)
MONOCYTES # BLD AUTO: 0.76 K/UL (ref 0–0.85)
MONOCYTES NFR BLD AUTO: 12.1 % (ref 0–13.4)
NEUTROPHILS # BLD AUTO: 3.03 K/UL (ref 1.82–7.42)
NEUTROPHILS NFR BLD: 48.3 % (ref 44–72)
NRBC # BLD AUTO: 0 K/UL
NRBC BLD-RTO: 0 /100 WBC (ref 0–0.2)
PLATELET # BLD AUTO: 178 K/UL (ref 164–446)
PMV BLD AUTO: 8.3 FL (ref 9–12.9)
POTASSIUM SERPL-SCNC: 4.1 MMOL/L (ref 3.6–5.5)
PROT SERPL-MCNC: 6.2 G/DL (ref 6–8.2)
RBC # BLD AUTO: 5.19 M/UL (ref 4.7–6.1)
SODIUM SERPL-SCNC: 139 MMOL/L (ref 135–145)
WBC # BLD AUTO: 6.3 K/UL (ref 4.8–10.8)

## 2025-03-15 PROCEDURE — 770022 HCHG ROOM/CARE - ICU (200)

## 2025-03-15 PROCEDURE — 700102 HCHG RX REV CODE 250 W/ 637 OVERRIDE(OP): Performed by: PHYSICIAN ASSISTANT

## 2025-03-15 PROCEDURE — A9270 NON-COVERED ITEM OR SERVICE: HCPCS | Performed by: NURSE PRACTITIONER

## 2025-03-15 PROCEDURE — 93010 ELECTROCARDIOGRAM REPORT: CPT | Performed by: INTERNAL MEDICINE

## 2025-03-15 PROCEDURE — A9270 NON-COVERED ITEM OR SERVICE: HCPCS | Performed by: PHYSICIAN ASSISTANT

## 2025-03-15 PROCEDURE — A9270 NON-COVERED ITEM OR SERVICE: HCPCS | Performed by: SURGERY

## 2025-03-15 PROCEDURE — 700111 HCHG RX REV CODE 636 W/ 250 OVERRIDE (IP): Performed by: PHYSICIAN ASSISTANT

## 2025-03-15 PROCEDURE — 85025 COMPLETE CBC W/AUTO DIFF WBC: CPT

## 2025-03-15 PROCEDURE — 700102 HCHG RX REV CODE 250 W/ 637 OVERRIDE(OP): Performed by: NURSE PRACTITIONER

## 2025-03-15 PROCEDURE — 80053 COMPREHEN METABOLIC PANEL: CPT

## 2025-03-15 PROCEDURE — 700102 HCHG RX REV CODE 250 W/ 637 OVERRIDE(OP): Performed by: SURGERY

## 2025-03-15 PROCEDURE — 93005 ELECTROCARDIOGRAM TRACING: CPT | Mod: TC | Performed by: STUDENT IN AN ORGANIZED HEALTH CARE EDUCATION/TRAINING PROGRAM

## 2025-03-15 PROCEDURE — 99291 CRITICAL CARE FIRST HOUR: CPT | Performed by: SURGERY

## 2025-03-15 RX ORDER — MIDODRINE HYDROCHLORIDE 5 MG/1
10 TABLET ORAL EVERY 8 HOURS
Status: DISCONTINUED | OUTPATIENT
Start: 2025-03-15 | End: 2025-03-18 | Stop reason: HOSPADM

## 2025-03-15 RX ADMIN — GABAPENTIN 300 MG: 300 CAPSULE ORAL at 21:27

## 2025-03-15 RX ADMIN — GABAPENTIN 300 MG: 300 CAPSULE ORAL at 05:23

## 2025-03-15 RX ADMIN — OMEPRAZOLE 20 MG: 20 CAPSULE, DELAYED RELEASE ORAL at 05:23

## 2025-03-15 RX ADMIN — ENOXAPARIN SODIUM 30 MG: 100 INJECTION SUBCUTANEOUS at 05:23

## 2025-03-15 RX ADMIN — ACETAMINOPHEN 1000 MG: 500 TABLET ORAL at 14:08

## 2025-03-15 RX ADMIN — ACETAMINOPHEN 1000 MG: 500 TABLET ORAL at 05:23

## 2025-03-15 RX ADMIN — ENOXAPARIN SODIUM 30 MG: 100 INJECTION SUBCUTANEOUS at 16:41

## 2025-03-15 RX ADMIN — SENNOSIDES AND DOCUSATE SODIUM 1 TABLET: 50; 8.6 TABLET ORAL at 21:27

## 2025-03-15 RX ADMIN — MAGNESIUM HYDROXIDE 30 ML: 2400 SUSPENSION ORAL at 16:41

## 2025-03-15 RX ADMIN — GABAPENTIN 300 MG: 300 CAPSULE ORAL at 14:07

## 2025-03-15 RX ADMIN — POLYETHYLENE GLYCOL 3350 1 PACKET: 17 POWDER, FOR SOLUTION ORAL at 16:41

## 2025-03-15 RX ADMIN — MIDODRINE HYDROCHLORIDE 10 MG: 5 TABLET ORAL at 14:07

## 2025-03-15 RX ADMIN — METAXALONE 800 MG: 800 TABLET ORAL at 05:23

## 2025-03-15 RX ADMIN — METAXALONE 800 MG: 800 TABLET ORAL at 11:45

## 2025-03-15 RX ADMIN — ACETAMINOPHEN 1000 MG: 500 TABLET ORAL at 21:27

## 2025-03-15 RX ADMIN — METAXALONE 800 MG: 800 TABLET ORAL at 16:46

## 2025-03-15 RX ADMIN — MIDODRINE HYDROCHLORIDE 5 MG: 5 TABLET ORAL at 06:38

## 2025-03-15 RX ADMIN — DOCUSATE SODIUM 100 MG: 100 CAPSULE, LIQUID FILLED ORAL at 16:41

## 2025-03-15 ASSESSMENT — PAIN DESCRIPTION - PAIN TYPE
TYPE: ACUTE PAIN

## 2025-03-15 NOTE — DISCHARGE PLANNING
Anticipate HH once medically cleared. TCC will no longer follow.  Please reach out to myself with any questions.

## 2025-03-15 NOTE — PROGRESS NOTES
Trauma / Surgical Daily Progress Note    Date of Service  3/15/2025    Chief Complaint  49 y.o. male admitted 3/12/2025 with Trauma    Interval Events  Some neck pain and generalized discomfort  EKG negative yesterday.   Paresthesia  Off Levophed currently- needs drip intermittent.   Increase midodrine.   Multimodal pain management.   Tolerating diet.       Review of Systems  Review of Systems     Vital Signs for last 24 hours  Temp:  [36.6 °C (97.9 °F)-37.1 °C (98.7 °F)] 36.7 °C (98.1 °F)  Pulse:  [54-75] 58  Resp:  [8-36] 16  BP: (107-146)/(62-82) 117/68  SpO2:  [93 %-96 %] 95 %    Hemodynamic parameters for last 24 hours       Respiratory Data     Respiration: 16, Pulse Oximetry: 95 %        RUL Breath Sounds: Clear, RML Breath Sounds: Diminished, RLL Breath Sounds: Diminished, KAYLENE Breath Sounds: Clear, LLL Breath Sounds: Diminished    Physical Exam  Physical Exam  Cardiovascular:      Rate and Rhythm: Regular rhythm.   Pulmonary:      Effort: No respiratory distress.   Abdominal:      General: There is no distension.      Tenderness: There is no abdominal tenderness.   Neurological:      General: No focal deficit present.      Mental Status: He is alert.         Laboratory  Recent Results (from the past 24 hours)   CBC with Differential: Tomorrow AM    Collection Time: 03/15/25  4:30 AM   Result Value Ref Range    WBC 6.3 4.8 - 10.8 K/uL    RBC 5.19 4.70 - 6.10 M/uL    Hemoglobin 14.9 14.0 - 18.0 g/dL    Hematocrit 43.6 42.0 - 52.0 %    MCV 84.0 81.4 - 97.8 fL    MCH 28.7 27.0 - 33.0 pg    MCHC 34.2 32.3 - 36.5 g/dL    RDW 43.0 35.9 - 50.0 fL    Platelet Count 178 164 - 446 K/uL    MPV 8.3 (L) 9.0 - 12.9 fL    Neutrophils-Polys 48.30 44.00 - 72.00 %    Lymphocytes 36.50 22.00 - 41.00 %    Monocytes 12.10 0.00 - 13.40 %    Eosinophils 2.20 0.00 - 6.90 %    Basophils 0.60 0.00 - 1.80 %    Immature Granulocytes 0.30 0.00 - 0.90 %    Nucleated RBC 0.00 0.00 - 0.20 /100 WBC    Neutrophils (Absolute) 3.03 1.82 -  7.42 K/uL    Lymphs (Absolute) 2.29 1.00 - 4.80 K/uL    Monos (Absolute) 0.76 0.00 - 0.85 K/uL    Eos (Absolute) 0.14 0.00 - 0.51 K/uL    Baso (Absolute) 0.04 0.00 - 0.12 K/uL    Immature Granulocytes (abs) 0.02 0.00 - 0.11 K/uL    NRBC (Absolute) 0.00 K/uL   Comp Metabolic Panel (CMP): Tomorrow AM    Collection Time: 03/15/25  4:30 AM   Result Value Ref Range    Sodium 139 135 - 145 mmol/L    Potassium 4.1 3.6 - 5.5 mmol/L    Chloride 104 96 - 112 mmol/L    Co2 25 20 - 33 mmol/L    Anion Gap 10.0 7.0 - 16.0    Glucose 88 65 - 99 mg/dL    Bun 13 8 - 22 mg/dL    Creatinine 1.01 0.50 - 1.40 mg/dL    Calcium 9.1 8.5 - 10.5 mg/dL    Correct Calcium 9.3 8.5 - 10.5 mg/dL    AST(SGOT) 28 12 - 45 U/L    ALT(SGPT) 35 2 - 50 U/L    Alkaline Phosphatase 93 30 - 99 U/L    Total Bilirubin 0.9 0.1 - 1.5 mg/dL    Albumin 3.8 3.2 - 4.9 g/dL    Total Protein 6.2 6.0 - 8.2 g/dL    Globulin 2.4 1.9 - 3.5 g/dL    A-G Ratio 1.6 g/dL   ESTIMATED GFR    Collection Time: 03/15/25  4:30 AM   Result Value Ref Range    GFR (CKD-EPI) 90 >60 mL/min/1.73 m 2   EKG    Collection Time: 03/15/25  6:21 AM   Result Value Ref Range    Report       [facility]    Test Date:  2025-03-15  Pt Name:    ZANA TELLEZ                  Department: [facility]  MRN:        [id number]                      Room:       T917  Gender:     Male                         Technician: ANNE-MARIE  :        1975                   Requested By:BANDAR BRANDT  Order #:    [id number]                    Reading MD: Jose Angel Patrick MD    Measurements  Intervals                                Axis  Rate:       56                           P:          25  WV:         162                          QRS:        42  QRSD:       115                          T:          20  QT:         428  QTc:        414    Interpretive Statements  Sinus bradycardia  Nonspecific intraventricular conduction delay    Electronically Signed On 03- 06:21:47 PDT by Jose Angel Patrick MD          Fluids    Intake/Output Summary (Last 24 hours) at 3/15/2025 1115  Last data filed at 3/15/2025 0438  Gross per 24 hour   Intake 1527.44 ml   Output 1875 ml   Net -347.56 ml       Core Measures & Quality Metrics  Labs reviewed, Radiology images reviewed and Medications reviewed  Gann catheter: No Gann                  RAP Score Total: 2    CAGE Results: negative Blood Alcohol>0.08: no       Assessment/Plan  * Trauma- (present on admission)  Assessment & Plan  Ground level fall.   Transient weakness immediately after the fall. Persistent paresthesias since.  Trauma Green Transfer Activation from [facility] in [place], [place].  Bernardino Aguirre MD. Trauma Surgery.    Central cord syndrome at C5 level of cervical spinal cord (HCC)- (present on admission)  Assessment & Plan  Central cord swelling without acute cervical fracture. C5 and C6 stenosis on CT scan.  Transient weakness immediately after the fall, with return of full strength. Persistent paresthesias since.   MRI completed, cord contusion.  Non-operative management.   3/14 continues Hyperperfusion protocol, MAP > 85 x 5-days.  3/15:  Intermittent levophed requirement.  Paresthesias,   No bracing required.  Capo Teran MD. Neurosurgeon. [facility].  Physiatry consulted.    Acid reflux- (present on admission)  Assessment & Plan  Chronic condition treated with omeprazole.  Resumed maintenance medication on admission.    Asthma- (present on admission)  Assessment & Plan  Chronic condition treated with albuterol.  Resumed maintenance medication on admission.    No contraindication to deep vein thrombosis (DVT) prophylaxis- (present on admission)  Assessment & Plan  VTE prophylaxis initially contraindicated secondary to elevated bleeding risk.  3/14 Trauma surveillance venous duplex ultrasonography ordered.  3/13 Lovenox initiated, approved by neurosurgery.        Discussed patient condition with RN, RT, and  Pharmacy.  CRITICAL CARE TIME EXCLUDING PROCEDURES: 35   minutes

## 2025-03-15 NOTE — CARE PLAN
The patient is Watcher - Medium risk of patient condition declining or worsening    Shift Goals  Clinical Goals: Continue hyperperfusion, pain control  Patient Goals: rest, pain control, comfort with movement  Family Goals: AKI    Progress made toward(s) clinical / shift goals:    Problem: Knowledge Deficit - Standard  Goal: Patient and family/care givers will demonstrate understanding of plan of care, disease process/condition, diagnostic tests and medications  Outcome: Progressing     Problem: Pain - Standard  Goal: Alleviation of pain or a reduction in pain to the patient’s comfort goal  Outcome: Progressing     Problem: Fall Risk  Goal: Patient will remain free from falls  Outcome: Progressing       Patient is not progressing towards the following goals:

## 2025-03-15 NOTE — CARE PLAN
The patient is Stable - Low risk of patient condition declining or worsening    Shift Goals  Clinical Goals: hyperperfusion, pulmonary hygiene, VSS  Patient Goals: rest  Family Goals: AKI    Progress made toward(s) clinical / shift goals:    Problem: Knowledge Deficit - Standard  Goal: Patient and family/care givers will demonstrate understanding of plan of care, disease process/condition, diagnostic tests and medications  Outcome: Progressing     Problem: Pain - Standard  Goal: Alleviation of pain or a reduction in pain to the patient’s comfort goal  Outcome: Progressing       Patient is not progressing towards the following goals:

## 2025-03-16 LAB
ALBUMIN SERPL BCP-MCNC: 4 G/DL (ref 3.2–4.9)
ALBUMIN/GLOB SERPL: 1.7 G/DL
ALP SERPL-CCNC: 95 U/L (ref 30–99)
ALT SERPL-CCNC: 40 U/L (ref 2–50)
ANION GAP SERPL CALC-SCNC: 11 MMOL/L (ref 7–16)
AST SERPL-CCNC: 34 U/L (ref 12–45)
BASOPHILS # BLD AUTO: 0.4 % (ref 0–1.8)
BASOPHILS # BLD: 0.02 K/UL (ref 0–0.12)
BILIRUB SERPL-MCNC: 0.5 MG/DL (ref 0.1–1.5)
BUN SERPL-MCNC: 16 MG/DL (ref 8–22)
CALCIUM ALBUM COR SERPL-MCNC: 9.4 MG/DL (ref 8.5–10.5)
CALCIUM SERPL-MCNC: 9.4 MG/DL (ref 8.5–10.5)
CHLORIDE SERPL-SCNC: 102 MMOL/L (ref 96–112)
CO2 SERPL-SCNC: 25 MMOL/L (ref 20–33)
COMPONENT CELLULAR 8504CLL: NORMAL
CREAT SERPL-MCNC: 1.05 MG/DL (ref 0.5–1.4)
EOSINOPHIL # BLD AUTO: 0.08 K/UL (ref 0–0.51)
EOSINOPHIL NFR BLD: 1.6 % (ref 0–6.9)
ERYTHROCYTE [DISTWIDTH] IN BLOOD BY AUTOMATED COUNT: 40.6 FL (ref 35.9–50)
GFR SERPLBLD CREATININE-BSD FMLA CKD-EPI: 86 ML/MIN/1.73 M 2
GLOBULIN SER CALC-MCNC: 2.4 G/DL (ref 1.9–3.5)
GLUCOSE SERPL-MCNC: 98 MG/DL (ref 65–99)
HCT VFR BLD AUTO: 44.4 % (ref 42–52)
HGB BLD-MCNC: 15.2 G/DL (ref 14–18)
IMM GRANULOCYTES # BLD AUTO: 0.01 K/UL (ref 0–0.11)
IMM GRANULOCYTES NFR BLD AUTO: 0.2 % (ref 0–0.9)
LYMPHOCYTES # BLD AUTO: 1.25 K/UL (ref 1–4.8)
LYMPHOCYTES NFR BLD: 24.8 % (ref 22–41)
MCH RBC QN AUTO: 27.8 PG (ref 27–33)
MCHC RBC AUTO-ENTMCNC: 34.2 G/DL (ref 32.3–36.5)
MCV RBC AUTO: 81.3 FL (ref 81.4–97.8)
MONOCYTES # BLD AUTO: 0.57 K/UL (ref 0–0.85)
MONOCYTES NFR BLD AUTO: 11.3 % (ref 0–13.4)
NEUTROPHILS # BLD AUTO: 3.11 K/UL (ref 1.82–7.42)
NEUTROPHILS NFR BLD: 61.7 % (ref 44–72)
NRBC # BLD AUTO: 0 K/UL
NRBC BLD-RTO: 0 /100 WBC (ref 0–0.2)
PLATELET # BLD AUTO: 171 K/UL (ref 164–446)
PMV BLD AUTO: 8.1 FL (ref 9–12.9)
POTASSIUM SERPL-SCNC: 3.9 MMOL/L (ref 3.6–5.5)
PROT SERPL-MCNC: 6.4 G/DL (ref 6–8.2)
RBC # BLD AUTO: 5.46 M/UL (ref 4.7–6.1)
SODIUM SERPL-SCNC: 138 MMOL/L (ref 135–145)
WBC # BLD AUTO: 5 K/UL (ref 4.8–10.8)

## 2025-03-16 PROCEDURE — A9270 NON-COVERED ITEM OR SERVICE: HCPCS | Performed by: PHYSICIAN ASSISTANT

## 2025-03-16 PROCEDURE — 700102 HCHG RX REV CODE 250 W/ 637 OVERRIDE(OP): Performed by: NURSE PRACTITIONER

## 2025-03-16 PROCEDURE — A9270 NON-COVERED ITEM OR SERVICE: HCPCS | Performed by: NURSE PRACTITIONER

## 2025-03-16 PROCEDURE — 770001 HCHG ROOM/CARE - MED/SURG/GYN PRIV*

## 2025-03-16 PROCEDURE — 80053 COMPREHEN METABOLIC PANEL: CPT

## 2025-03-16 PROCEDURE — 700102 HCHG RX REV CODE 250 W/ 637 OVERRIDE(OP): Performed by: PHYSICIAN ASSISTANT

## 2025-03-16 PROCEDURE — 85025 COMPLETE CBC W/AUTO DIFF WBC: CPT

## 2025-03-16 PROCEDURE — 99291 CRITICAL CARE FIRST HOUR: CPT | Performed by: SURGERY

## 2025-03-16 PROCEDURE — 700111 HCHG RX REV CODE 636 W/ 250 OVERRIDE (IP): Performed by: PHYSICIAN ASSISTANT

## 2025-03-16 RX ADMIN — POLYETHYLENE GLYCOL 3350 1 PACKET: 17 POWDER, FOR SOLUTION ORAL at 06:00

## 2025-03-16 RX ADMIN — METAXALONE 800 MG: 800 TABLET ORAL at 06:40

## 2025-03-16 RX ADMIN — OMEPRAZOLE 20 MG: 20 CAPSULE, DELAYED RELEASE ORAL at 06:41

## 2025-03-16 RX ADMIN — ACETAMINOPHEN 1000 MG: 500 TABLET ORAL at 16:25

## 2025-03-16 RX ADMIN — ENOXAPARIN SODIUM 30 MG: 100 INJECTION SUBCUTANEOUS at 06:41

## 2025-03-16 RX ADMIN — METAXALONE 800 MG: 800 TABLET ORAL at 17:29

## 2025-03-16 RX ADMIN — METAXALONE 800 MG: 800 TABLET ORAL at 11:34

## 2025-03-16 RX ADMIN — SENNOSIDES AND DOCUSATE SODIUM 1 TABLET: 50; 8.6 TABLET ORAL at 20:29

## 2025-03-16 RX ADMIN — GABAPENTIN 300 MG: 300 CAPSULE ORAL at 16:25

## 2025-03-16 RX ADMIN — DOCUSATE SODIUM 100 MG: 100 CAPSULE, LIQUID FILLED ORAL at 17:29

## 2025-03-16 RX ADMIN — GABAPENTIN 300 MG: 300 CAPSULE ORAL at 06:41

## 2025-03-16 RX ADMIN — ACETAMINOPHEN 1000 MG: 500 TABLET ORAL at 06:41

## 2025-03-16 RX ADMIN — MAGNESIUM HYDROXIDE 30 ML: 2400 SUSPENSION ORAL at 17:29

## 2025-03-16 RX ADMIN — ENOXAPARIN SODIUM 30 MG: 100 INJECTION SUBCUTANEOUS at 17:29

## 2025-03-16 RX ADMIN — DOCUSATE SODIUM 100 MG: 100 CAPSULE, LIQUID FILLED ORAL at 06:41

## 2025-03-16 RX ADMIN — POLYETHYLENE GLYCOL 3350 1 PACKET: 17 POWDER, FOR SOLUTION ORAL at 17:29

## 2025-03-16 ASSESSMENT — PAIN DESCRIPTION - PAIN TYPE
TYPE: ACUTE PAIN

## 2025-03-16 NOTE — CARE PLAN
The patient is Stable - Low risk of patient condition declining or worsening    Shift Goals  Clinical Goals: hyperperfusion, MAP >85  Patient Goals: rest, sleep  Family Goals: updates    Progress made toward(s) clinical / shift goals:    Problem: Pain - Standard  Goal: Alleviation of pain or a reduction in pain to the patient’s comfort goal  Outcome: Progressing     Problem: Neuro Status  Goal: Neuro status will remain stable or improve  Outcome: Progressing     Problem: Self Care  Goal: Patient will have the ability to perform ADLs independently or with assistance (bathe, groom, dress, toilet and feed)  Outcome: Progressing     Problem: Mobility  Goal: Patient's capacity to carry out activities will improve  Outcome: Progressing       Patient is not progressing towards the following goals:

## 2025-03-16 NOTE — PROGRESS NOTES
Neurosurgery Progress Note    Subjective:  No acute events over night  Feeling about the same or a little weaker today    Exam:  Sitting up in chair eating break  BUE 5/5 except deltoids 4+/5  BLE 5/5  no clonus      BP  Min: 103/64  Max: 211/88  Pulse  Av.5  Min: 54  Max: 104  Resp  Av.9  Min: 8  Max: 37  Temp  Av.8 °C (98.2 °F)  Min: 36.7 °C (98 °F)  Max: 36.9 °C (98.4 °F)  SpO2  Av.5 %  Min: 88 %  Max: 97 %    No data recorded    Recent Labs     25  0354 25  0417 03/15/25  0430   WBC 6.9 5.5 6.3   RBC 5.44 5.12 5.19   HEMOGLOBIN 15.2 14.5 14.9   HEMATOCRIT 44.6 41.6* 43.6   MCV 82.0 81.3* 84.0   MCH 27.9 28.3 28.7   MCHC 34.1 34.9 34.2   RDW 40.5 42.2 43.0   PLATELETCT 207 193 178   MPV 8.1* 8.1* 8.3*     Recent Labs     25  0354 25  0417 03/15/25  0430   SODIUM 138 140 139   POTASSIUM 4.3 4.3 4.1   CHLORIDE 106 108 104   CO2 21 22 25   GLUCOSE 96 104* 88   BUN 20 14 13   CREATININE 0.92 0.94 1.01   CALCIUM 8.2* 8.5 9.1     Recent Labs     25  2151   APTT 30.4   INR 1.09           Intake/Output                         25 0700 - 03/15/25 0659 03/15/25 0700 - 25 0659     [id number] 6676-1531 Total 0120-4757 4572-6126 Total                 Intake    P.O.  1200  240 1440  1020  -- 1020    P.O. 8154 925 2815 1020 -- 1020    I.V.  721.4  8 729.4  0  -- 0    Norepinephrine Volume 8.8 8 16.8 0 -- 0    Volume (mL) (NS infusion) 712.6 -- 712.6 0 -- 0    Total Intake 1921.4 248 2169.4 1020 -- 1020       Output    Urine  875  1000 187  1350  -- 1350    Number of Times Voided -- 2 x 2 x 4 x -- 4 x    Urine Void (mL) 875 1000  1350 -- 1350    Stool  --  -- --  --  -- --    Number of Times Stooled 1 x 0 x 1 x 0 x -- 0 x    Total Output 875 1000 187 1350 -- 1350       Net I/O     1046.4 -752 294.4 -330 -- -330              Intake/Output Summary (Last 24 hours) at 3/15/2025 2126  Last data filed at 3/15/2025 1800  Gross per 24 hour   Intake 1027.98 ml   Output  2350 ml   Net -1322.02 ml             midodrine  10 mg Q8HR    gabapentin  300 mg Q8HRS    albuterol  2 Puff Q6HRS PRN    omeprazole  20 mg DAILY    acetaminophen  1,000 mg Q8HRS    Followed by    [START ON 3/17/2025] acetaminophen  1,000 mg Q8HRS PRN    enoxaparin (LOVENOX) injection  30 mg Q12HRS    Respiratory Therapy Consult   Continuous RT    Pharmacy Consult Request  1 Each PHARMACY TO DOSE    ondansetron  4 mg Q4HRS PRN    Or    ondansetron  4 mg Q4HRS PRN    docusate sodium  100 mg BID    senna-docusate  1 Tablet Nightly    senna-docusate  1 Tablet Q24HRS PRN    polyethylene glycol/lytes  1 Packet BID    magnesium hydroxide  30 mL DAILY AT 1800    bisacodyl  10 mg Q24HRS PRN    sodium phosphate  1 Enema Once PRN    NS   Continuous    oxyCODONE immediate-release  5 mg Q3HRS PRN    Or    oxyCODONE immediate-release  10 mg Q3HRS PRN    Or    HYDROmorphone  0.5 mg Q3HRS PRN    metaxalone  800 mg TID    NORepinephrine  0-1 mcg/kg/min (Ideal) Continuous       Assessment and Plan:  Hospital day #3  POD #na  Prophylactic anticoagulation: yes             Central cord injury  MAP >85 for 5 day, day 2/5, on levo  Strength improving  No need for cervical collar  Q4hr neuro checks  If stable overnight ok to floor once off levo

## 2025-03-16 NOTE — PROGRESS NOTES
Trauma / Surgical Daily Progress Note    Date of Service  3/16/2025    Chief Complaint  49 y.o. male admitted 3/12/2025 with Trauma    Interval Events  Off levophed.  On midodrine as needed  Mild paresthesias  Symmetrical ,  slightly weak.  Collar on.    IS 1250  Stop labs        Review of Systems  Review of Systems     Vital Signs for last 24 hours  Temp:  [36.7 °C (98 °F)-36.9 °C (98.4 °F)] 36.8 °C (98.2 °F)  Pulse:  [] 65  Resp:  [11-40] 16  BP: (118-211)/() 126/77  SpO2:  [89 %-97 %] 92 %    Hemodynamic parameters for last 24 hours       Respiratory Data     Respiration: 16, Pulse Oximetry: 92 %        RUL Breath Sounds: Clear, RML Breath Sounds: Diminished, RLL Breath Sounds: Diminished, KAYLENE Breath Sounds: Clear, LLL Breath Sounds: Diminished    Physical Exam  Physical Exam  Cardiovascular:      Rate and Rhythm: Regular rhythm.   Pulmonary:      Effort: No respiratory distress.   Abdominal:      General: There is no distension.      Tenderness: There is no abdominal tenderness.   Neurological:      Mental Status: He is alert.      Comments:  slightly weak         Laboratory  Recent Results (from the past 24 hours)   CBC with Differential: Tomorrow AM    Collection Time: 03/16/25  6:44 AM   Result Value Ref Range    WBC 5.0 4.8 - 10.8 K/uL    RBC 5.46 4.70 - 6.10 M/uL    Hemoglobin 15.2 14.0 - 18.0 g/dL    Hematocrit 44.4 42.0 - 52.0 %    MCV 81.3 (L) 81.4 - 97.8 fL    MCH 27.8 27.0 - 33.0 pg    MCHC 34.2 32.3 - 36.5 g/dL    RDW 40.6 35.9 - 50.0 fL    Platelet Count 171 164 - 446 K/uL    MPV 8.1 (L) 9.0 - 12.9 fL    Neutrophils-Polys 61.70 44.00 - 72.00 %    Lymphocytes 24.80 22.00 - 41.00 %    Monocytes 11.30 0.00 - 13.40 %    Eosinophils 1.60 0.00 - 6.90 %    Basophils 0.40 0.00 - 1.80 %    Immature Granulocytes 0.20 0.00 - 0.90 %    Nucleated RBC 0.00 0.00 - 0.20 /100 WBC    Neutrophils (Absolute) 3.11 1.82 - 7.42 K/uL    Lymphs (Absolute) 1.25 1.00 - 4.80 K/uL    Monos (Absolute) 0.57  0.00 - 0.85 K/uL    Eos (Absolute) 0.08 0.00 - 0.51 K/uL    Baso (Absolute) 0.02 0.00 - 0.12 K/uL    Immature Granulocytes (abs) 0.01 0.00 - 0.11 K/uL    NRBC (Absolute) 0.00 K/uL   Comp Metabolic Panel (CMP): Tomorrow AM    Collection Time: 03/16/25  6:44 AM   Result Value Ref Range    Sodium 138 135 - 145 mmol/L    Potassium 3.9 3.6 - 5.5 mmol/L    Chloride 102 96 - 112 mmol/L    Co2 25 20 - 33 mmol/L    Anion Gap 11.0 7.0 - 16.0    Glucose 98 65 - 99 mg/dL    Bun 16 8 - 22 mg/dL    Creatinine 1.05 0.50 - 1.40 mg/dL    Calcium 9.4 8.5 - 10.5 mg/dL    Correct Calcium 9.4 8.5 - 10.5 mg/dL    AST(SGOT) 34 12 - 45 U/L    ALT(SGPT) 40 2 - 50 U/L    Alkaline Phosphatase 95 30 - 99 U/L    Total Bilirubin 0.5 0.1 - 1.5 mg/dL    Albumin 4.0 3.2 - 4.9 g/dL    Total Protein 6.4 6.0 - 8.2 g/dL    Globulin 2.4 1.9 - 3.5 g/dL    A-G Ratio 1.7 g/dL   ESTIMATED GFR    Collection Time: 03/16/25  6:44 AM   Result Value Ref Range    GFR (CKD-EPI) 86 >60 mL/min/1.73 m 2       Fluids    Intake/Output Summary (Last 24 hours) at 3/16/2025 1110  Last data filed at 3/16/2025 0000  Gross per 24 hour   Intake 820 ml   Output 1050 ml   Net -230 ml       Core Measures & Quality Metrics  Labs reviewed, Radiology images reviewed and Medications reviewed                    RAP Score Total: 2    CAGE Results: negative Blood Alcohol>0.08: no       Assessment/Plan  * Trauma- (present on admission)  Assessment & Plan  Ground level fall.   Transient weakness immediately after the fall. Persistent paresthesias since.  Trauma Green Transfer Activation from [facility] in [place], [place].  Bernardino Aguirre MD. Trauma Surgery.    Central cord syndrome at C5 level of cervical spinal cord (HCC)- (present on admission)  Assessment & Plan  Central cord swelling without acute cervical fracture. C5 and C6 stenosis on CT scan.  Transient weakness immediately after the fall, with return of full strength. Persistent paresthesias since.   MRI  completed, cord contusion.  Non-operative management.   3/14 continues Hyperperfusion protocol, MAP > 85 x 5-days.  3/16:  Off levophed.  Midodrine with hold for MAP > 100.   Paresthesias. Faint  /deltoid weakness at times  No bracing required.  Capo Teran MD. Neurosurgeon. [facility].  Physiatry consulted.    Acid reflux- (present on admission)  Assessment & Plan  Chronic condition treated with omeprazole.  Resumed maintenance medication on admission.    Asthma- (present on admission)  Assessment & Plan  Chronic condition treated with albuterol.  Resumed maintenance medication on admission.    No contraindication to deep vein thrombosis (DVT) prophylaxis- (present on admission)  Assessment & Plan  VTE prophylaxis initially contraindicated secondary to elevated bleeding risk.  3/14 Trauma surveillance venous duplex ultrasonography ordered.  3/13 Lovenox initiated, approved by neurosurgery.        Discussed patient condition with RN, RT, and Pharmacy.  CRITICAL CARE TIME EXCLUDING PROCEDURES: 35  minutes

## 2025-03-16 NOTE — CARE PLAN
The patient is Stable - Low risk of patient condition declining or worsening    Shift Goals  Clinical Goals: hyperperfusion; MAP >85; improved neuro exam  Patient Goals: Rest; pain management  Family Goals: KAI (None present)    Progress made toward(s) clinical / shift goals:      Problem: Knowledge Deficit - Standard  Goal: Patient and family/care givers will demonstrate understanding of plan of care, disease process/condition, diagnostic tests and medications  3/16/2025 0352 by Afshin Simpson R.N.  Outcome: Progressing  3/16/2025 0352 by Afshin Simpson R.N.  Outcome: Progressing     Problem: Pain - Standard  Goal: Alleviation of pain or a reduction in pain to the patient’s comfort goal  3/16/2025 0352 by Afshin Simpson R.N.  Outcome: Progressing  3/16/2025 0352 by Afshin Simpson R.N.  Outcome: Progressing     Problem: Skin Integrity  Goal: Skin integrity is maintained or improved  3/16/2025 0352 by Afshin Simpson R.N.  Outcome: Progressing  3/16/2025 0352 by Afshin Simpson R.N.  Outcome: Progressing     Problem: Fall Risk  Goal: Patient will remain free from falls  3/16/2025 0352 by Afshin Simpson R.N.  Outcome: Progressing  3/16/2025 0352 by Afshin Simpson R.N.  Outcome: Progressing     Problem: Neuro Status  Goal: Neuro status will remain stable or improve  Outcome: Progressing     Problem: Self Care  Goal: Patient will have the ability to perform ADLs independently or with assistance (bathe, groom, dress, toilet and feed)  Outcome: Progressing     Problem: Mobility  Goal: Patient's capacity to carry out activities will improve  Outcome: Progressing       Patient is not progressing towards the following goals:

## 2025-03-17 LAB
ALBUMIN SERPL BCP-MCNC: 4.1 G/DL (ref 3.2–4.9)
ALBUMIN/GLOB SERPL: 1.6 G/DL
ALP SERPL-CCNC: 102 U/L (ref 30–99)
ALT SERPL-CCNC: 61 U/L (ref 2–50)
ANION GAP SERPL CALC-SCNC: 12 MMOL/L (ref 7–16)
AST SERPL-CCNC: 45 U/L (ref 12–45)
BASOPHILS # BLD AUTO: 0.3 % (ref 0–1.8)
BASOPHILS # BLD: 0.02 K/UL (ref 0–0.12)
BILIRUB SERPL-MCNC: 0.5 MG/DL (ref 0.1–1.5)
BUN SERPL-MCNC: 13 MG/DL (ref 8–22)
CALCIUM ALBUM COR SERPL-MCNC: 9.6 MG/DL (ref 8.5–10.5)
CALCIUM SERPL-MCNC: 9.7 MG/DL (ref 8.5–10.5)
CHLORIDE SERPL-SCNC: 103 MMOL/L (ref 96–112)
CO2 SERPL-SCNC: 24 MMOL/L (ref 20–33)
CREAT SERPL-MCNC: 0.94 MG/DL (ref 0.5–1.4)
EKG IMPRESSION: NORMAL
EOSINOPHIL # BLD AUTO: 0.1 K/UL (ref 0–0.51)
EOSINOPHIL NFR BLD: 1.5 % (ref 0–6.9)
ERYTHROCYTE [DISTWIDTH] IN BLOOD BY AUTOMATED COUNT: 39.8 FL (ref 35.9–50)
GFR SERPLBLD CREATININE-BSD FMLA CKD-EPI: 98 ML/MIN/1.73 M 2
GLOBULIN SER CALC-MCNC: 2.6 G/DL (ref 1.9–3.5)
GLUCOSE SERPL-MCNC: 93 MG/DL (ref 65–99)
HCT VFR BLD AUTO: 44.5 % (ref 42–52)
HGB BLD-MCNC: 15.3 G/DL (ref 14–18)
IMM GRANULOCYTES # BLD AUTO: 0.02 K/UL (ref 0–0.11)
IMM GRANULOCYTES NFR BLD AUTO: 0.3 % (ref 0–0.9)
LYMPHOCYTES # BLD AUTO: 1.61 K/UL (ref 1–4.8)
LYMPHOCYTES NFR BLD: 23.6 % (ref 22–41)
MAGNESIUM SERPL-MCNC: 1.9 MG/DL (ref 1.5–2.5)
MCH RBC QN AUTO: 28.1 PG (ref 27–33)
MCHC RBC AUTO-ENTMCNC: 34.4 G/DL (ref 32.3–36.5)
MCV RBC AUTO: 81.8 FL (ref 81.4–97.8)
MONOCYTES # BLD AUTO: 0.59 K/UL (ref 0–0.85)
MONOCYTES NFR BLD AUTO: 8.7 % (ref 0–13.4)
NEUTROPHILS # BLD AUTO: 4.47 K/UL (ref 1.82–7.42)
NEUTROPHILS NFR BLD: 65.6 % (ref 44–72)
NRBC # BLD AUTO: 0 K/UL
NRBC BLD-RTO: 0 /100 WBC (ref 0–0.2)
PHOSPHATE SERPL-MCNC: 3.4 MG/DL (ref 2.5–4.5)
PLATELET # BLD AUTO: 181 K/UL (ref 164–446)
PMV BLD AUTO: 8.3 FL (ref 9–12.9)
POTASSIUM SERPL-SCNC: 3.7 MMOL/L (ref 3.6–5.5)
PROT SERPL-MCNC: 6.7 G/DL (ref 6–8.2)
RBC # BLD AUTO: 5.44 M/UL (ref 4.7–6.1)
SODIUM SERPL-SCNC: 139 MMOL/L (ref 135–145)
WBC # BLD AUTO: 6.8 K/UL (ref 4.8–10.8)

## 2025-03-17 PROCEDURE — A9270 NON-COVERED ITEM OR SERVICE: HCPCS | Performed by: PHYSICIAN ASSISTANT

## 2025-03-17 PROCEDURE — A9270 NON-COVERED ITEM OR SERVICE: HCPCS | Performed by: NURSE PRACTITIONER

## 2025-03-17 PROCEDURE — 36415 COLL VENOUS BLD VENIPUNCTURE: CPT

## 2025-03-17 PROCEDURE — 99232 SBSQ HOSP IP/OBS MODERATE 35: CPT | Performed by: NURSE PRACTITIONER

## 2025-03-17 PROCEDURE — 84100 ASSAY OF PHOSPHORUS: CPT

## 2025-03-17 PROCEDURE — 700102 HCHG RX REV CODE 250 W/ 637 OVERRIDE(OP): Performed by: NURSE PRACTITIONER

## 2025-03-17 PROCEDURE — 700102 HCHG RX REV CODE 250 W/ 637 OVERRIDE(OP): Performed by: SURGERY

## 2025-03-17 PROCEDURE — 97530 THERAPEUTIC ACTIVITIES: CPT | Mod: CQ

## 2025-03-17 PROCEDURE — 700102 HCHG RX REV CODE 250 W/ 637 OVERRIDE(OP): Performed by: PHYSICIAN ASSISTANT

## 2025-03-17 PROCEDURE — 85025 COMPLETE CBC W/AUTO DIFF WBC: CPT

## 2025-03-17 PROCEDURE — 80053 COMPREHEN METABOLIC PANEL: CPT

## 2025-03-17 PROCEDURE — 83735 ASSAY OF MAGNESIUM: CPT

## 2025-03-17 PROCEDURE — 93005 ELECTROCARDIOGRAM TRACING: CPT | Mod: TC | Performed by: NURSE PRACTITIONER

## 2025-03-17 PROCEDURE — 93010 ELECTROCARDIOGRAM REPORT: CPT | Performed by: INTERNAL MEDICINE

## 2025-03-17 PROCEDURE — 700111 HCHG RX REV CODE 636 W/ 250 OVERRIDE (IP): Performed by: PHYSICIAN ASSISTANT

## 2025-03-17 PROCEDURE — 700111 HCHG RX REV CODE 636 W/ 250 OVERRIDE (IP): Mod: JZ | Performed by: NURSE PRACTITIONER

## 2025-03-17 PROCEDURE — A9270 NON-COVERED ITEM OR SERVICE: HCPCS | Performed by: SURGERY

## 2025-03-17 PROCEDURE — 770001 HCHG ROOM/CARE - MED/SURG/GYN PRIV*

## 2025-03-17 PROCEDURE — 97116 GAIT TRAINING THERAPY: CPT | Mod: CQ

## 2025-03-17 RX ADMIN — OXYCODONE HYDROCHLORIDE 5 MG: 5 TABLET ORAL at 22:02

## 2025-03-17 RX ADMIN — POLYETHYLENE GLYCOL 3350 1 PACKET: 17 POWDER, FOR SOLUTION ORAL at 05:47

## 2025-03-17 RX ADMIN — OXYCODONE HYDROCHLORIDE 10 MG: 10 TABLET ORAL at 01:43

## 2025-03-17 RX ADMIN — DOCUSATE SODIUM 100 MG: 100 CAPSULE, LIQUID FILLED ORAL at 18:11

## 2025-03-17 RX ADMIN — ACETAMINOPHEN 1000 MG: 500 TABLET ORAL at 14:00

## 2025-03-17 RX ADMIN — GABAPENTIN 300 MG: 300 CAPSULE ORAL at 13:59

## 2025-03-17 RX ADMIN — GABAPENTIN 300 MG: 300 CAPSULE ORAL at 22:03

## 2025-03-17 RX ADMIN — MIDODRINE HYDROCHLORIDE 10 MG: 5 TABLET ORAL at 14:05

## 2025-03-17 RX ADMIN — ALBUTEROL SULFATE 2 PUFF: 90 AEROSOL, METERED RESPIRATORY (INHALATION) at 14:02

## 2025-03-17 RX ADMIN — ONDANSETRON 4 MG: 2 INJECTION INTRAMUSCULAR; INTRAVENOUS at 05:47

## 2025-03-17 RX ADMIN — METAXALONE 800 MG: 800 TABLET ORAL at 14:00

## 2025-03-17 RX ADMIN — SENNOSIDES AND DOCUSATE SODIUM 1 TABLET: 50; 8.6 TABLET ORAL at 22:02

## 2025-03-17 RX ADMIN — MIDODRINE HYDROCHLORIDE 10 MG: 5 TABLET ORAL at 22:03

## 2025-03-17 RX ADMIN — ENOXAPARIN SODIUM 30 MG: 100 INJECTION SUBCUTANEOUS at 18:11

## 2025-03-17 RX ADMIN — METAXALONE 800 MG: 800 TABLET ORAL at 18:11

## 2025-03-17 RX ADMIN — DOCUSATE SODIUM 100 MG: 100 CAPSULE, LIQUID FILLED ORAL at 05:47

## 2025-03-17 RX ADMIN — ACETAMINOPHEN 1000 MG: 500 TABLET ORAL at 05:47

## 2025-03-17 RX ADMIN — ENOXAPARIN SODIUM 30 MG: 100 INJECTION SUBCUTANEOUS at 05:47

## 2025-03-17 RX ADMIN — OMEPRAZOLE 20 MG: 20 CAPSULE, DELAYED RELEASE ORAL at 05:47

## 2025-03-17 RX ADMIN — ALBUTEROL SULFATE 2 PUFF: 90 AEROSOL, METERED RESPIRATORY (INHALATION) at 19:32

## 2025-03-17 RX ADMIN — MIDODRINE HYDROCHLORIDE 10 MG: 5 TABLET ORAL at 05:47

## 2025-03-17 RX ADMIN — GABAPENTIN 300 MG: 300 CAPSULE ORAL at 05:48

## 2025-03-17 ASSESSMENT — COGNITIVE AND FUNCTIONAL STATUS - GENERAL
MOBILITY SCORE: 18
STANDING UP FROM CHAIR USING ARMS: A LITTLE
MOVING FROM LYING ON BACK TO SITTING ON SIDE OF FLAT BED: A LITTLE
WALKING IN HOSPITAL ROOM: A LITTLE
MOVING TO AND FROM BED TO CHAIR: A LITTLE
SUGGESTED CMS G CODE MODIFIER MOBILITY: CK
TURNING FROM BACK TO SIDE WHILE IN FLAT BAD: A LITTLE
CLIMB 3 TO 5 STEPS WITH RAILING: A LITTLE

## 2025-03-17 ASSESSMENT — ENCOUNTER SYMPTOMS
PSYCHIATRIC NEGATIVE: 1
RESPIRATORY NEGATIVE: 1
SENSORY CHANGE: 1
FOCAL WEAKNESS: 1
MYALGIAS: 1
ROS GI COMMENTS: BM 3/16

## 2025-03-17 ASSESSMENT — GAIT ASSESSMENTS
GAIT LEVEL OF ASSIST: STANDBY ASSIST
ASSISTIVE DEVICE: FRONT WHEEL WALKER

## 2025-03-17 ASSESSMENT — PAIN DESCRIPTION - PAIN TYPE
TYPE: ACUTE PAIN
TYPE: ACUTE PAIN

## 2025-03-17 NOTE — PROGRESS NOTES
Neurosurgery Progress Note    Subjective:  No acute events over night  Feels tired  Continued paresthesia BUE    Exam:  Sitting up in chair eating break  BUE 5/5 except bilateral  4+/5  BLE 5/5  no clonus      BP  Min: 86/61  Max: 157/88  Pulse  Av  Min: 52  Max: 109  Resp  Av.3  Min: 12  Max: 35  Temp  Av.8 °C (98.2 °F)  Min: 35.8 °C (96.4 °F)  Max: 37.1 °C (98.8 °F)  SpO2  Av.3 %  Min: 89 %  Max: 98 %    No data recorded    Recent Labs     03/15/25  0430 25  0644 25  0029   WBC 6.3 5.0 6.8   RBC 5.19 5.46 5.44   HEMOGLOBIN 14.9 15.2 15.3   HEMATOCRIT 43.6 44.4 44.5   MCV 84.0 81.3* 81.8   MCH 28.7 27.8 28.1   MCHC 34.2 34.2 34.4   RDW 43.0 40.6 39.8   PLATELETCT 178 171 181   MPV 8.3* 8.1* 8.3*     Recent Labs     03/15/25  0430 25  0644 25  0029   SODIUM 139 138 139   POTASSIUM 4.1 3.9 3.7   CHLORIDE 104 102 103   CO2  24   GLUCOSE 88 98 93   BUN 13 16 13   CREATININE 1.01 1.05 0.94   CALCIUM 9.1 9.4 9.7                 Intake/Output                         25 0700 - 25 0659 25 07 - 25 0659      Total 1900-0659 Total                 Intake    P.O.  830  -- 830  --  -- --    P.O. 830 -- 830 -- -- --    Total Intake 830 -- 830 -- -- --       Output    Urine  300  -- 300  --  -- --    Number of Times Voided 1 x 2 x 3 x -- -- --    Urine Void (mL) 300 -- 300 -- -- --    Stool  --  -- --  --  -- --    Number of Times Stooled 1 x 1 x 2 x -- -- --    Total Output 300 -- 300 -- -- --       Net I/O     530 -- 530 -- -- --              Intake/Output Summary (Last 24 hours) at 3/17/2025 0958  Last data filed at 3/16/2025 1700  Gross per 24 hour   Intake 680 ml   Output 300 ml   Net 380 ml             midodrine  10 mg Q8HR    gabapentin  300 mg Q8HRS    albuterol  2 Puff Q6HRS PRN    omeprazole  20 mg DAILY    acetaminophen  1,000 mg Q8HRS    Followed by    acetaminophen  1,000 mg Q8HRS PRN    enoxaparin (LOVENOX)  injection  30 mg Q12HRS    Respiratory Therapy Consult   Continuous RT    ondansetron  4 mg Q4HRS PRN    Or    ondansetron  4 mg Q4HRS PRN    docusate sodium  100 mg BID    senna-docusate  1 Tablet Nightly    senna-docusate  1 Tablet Q24HRS PRN    polyethylene glycol/lytes  1 Packet BID    magnesium hydroxide  30 mL DAILY AT 1800    bisacodyl  10 mg Q24HRS PRN    sodium phosphate  1 Enema Once PRN    oxyCODONE immediate-release  5 mg Q3HRS PRN    Or    oxyCODONE immediate-release  10 mg Q3HRS PRN    Or    HYDROmorphone  0.5 mg Q3HRS PRN    metaxalone  800 mg TID       Assessment and Plan:  Hospital day #5  POD #na  Prophylactic anticoagulation: yes             Central cord injury  MAP >85 through today  Episode of MAP <85 this AM, Midodrine has been held last 24hrs.  BP improved after this AM midodrine dosage.  Order adjusted, check VS Q2hrs.  Strength stable  No need for cervical collar  Q4hr neuro checks

## 2025-03-17 NOTE — PROGRESS NOTES
"  Trauma / Surgical Daily Progress Note    Date of Service  3/17/2025    Chief Complaint  49 y.o. male admitted 3/12/2025  as trauma green xfer - GLF - central cord syndrome    Interval Events  Transferred to roblero  MAP 80 on Midodrine - repeat BP - MAP <85  WBC 6.8 / Hgb 15.3  Episode of \"unresponsiveness\" while on the toilet ~ 0500  EKG unremarkable  No change in neuro assessment   On Lovenox     - Further labs unless clinically indicated   -  referral placed  - Anticipate home in next 24-48 hours     Review of Systems  Review of Systems   Constitutional:  Positive for malaise/fatigue.   HENT: Negative.     Respiratory: Negative.     Gastrointestinal:         BM 3/16   Genitourinary:         Voiding   Musculoskeletal:  Positive for myalgias.   Neurological:  Positive for sensory change and focal weakness.   Psychiatric/Behavioral: Negative.     All other systems reviewed and are negative.       Vital Signs  Temp:  [35.8 °C (96.4 °F)-37.1 °C (98.8 °F)] 36.7 °C (98.1 °F)  Pulse:  [] 73  Resp:  [12-42] 18  BP: ()/() 103/68  SpO2:  [89 %-98 %] 98 %    Physical Exam  Physical Exam  Vitals and nursing note reviewed.   Constitutional:       General: He is not in acute distress.     Appearance: Normal appearance.   HENT:      Right Ear: External ear normal.      Left Ear: External ear normal.      Nose: Nose normal.      Mouth/Throat:      Mouth: Mucous membranes are moist.      Pharynx: Oropharynx is clear.   Eyes:      General:         Right eye: No discharge.         Left eye: No discharge.      Extraocular Movements: Extraocular movements intact.   Pulmonary:      Effort: Pulmonary effort is normal. No respiratory distress.   Chest:      Chest wall: No tenderness.   Abdominal:      General: There is no distension.      Palpations: Abdomen is soft.      Tenderness: There is no abdominal tenderness.   Musculoskeletal:      Cervical back: No muscular tenderness.   Skin:     General: Skin is warm and " dry.      Capillary Refill: Capillary refill takes less than 2 seconds.   Neurological:      Mental Status: He is alert and oriented to person, place, and time.      Comments: Subjective hyperesthesias  Strength 5/5,  slightly weak     Psychiatric:         Mood and Affect: Mood normal.         Behavior: Behavior normal.         Thought Content: Thought content normal.         Core Measures & Quality Metrics  Labs reviewed, Radiology images reviewed and Medications reviewed  Gann catheter: No Gann      DVT Prophylaxis: Enoxaparin (Lovenox)  DVT prophylaxis - mechanical: SCDs      Assessed for rehab: Patient was assess for and/or received rehabilitation services during this hospitalization    RAP Score Total: 2    CAGE Results: negative Blood Alcohol>0.08: no       Assessment/Plan  * Trauma- (present on admission)  Assessment & Plan  Ground level fall.   Transient weakness immediately after the fall. Persistent paresthesias since.  Trauma Green Transfer Activation from [facility] in [place], [place].  Bernardino Aguirre MD. Trauma Surgery.    Central cord syndrome at C5 level of cervical spinal cord (HCC)- (present on admission)  Assessment & Plan  Central cord swelling without acute cervical fracture. C5 and C6 stenosis on CT scan.  Transient weakness immediately after the fall, with return of full strength. Persistent paresthesias since.   MRI completed, cord contusion.  Non-operative management.   3/14 Continues Hyperperfusion protocol, MAP > 85 x 5-days.  3/16 Off levophed.  Midodrine with hold for MAP > 100.   Paresthesias. Faint  /deltoid weakness at times  No bracing required.  Capo Teran MD. Neurosurgeon. [facility].  Physiatry consulted.    Acid reflux- (present on admission)  Assessment & Plan  Chronic condition treated with omeprazole.  Resumed maintenance medication on admission.    Asthma- (present on admission)  Assessment & Plan  Chronic condition treated with  albuterol.  Resumed maintenance medication on admission.    No contraindication to deep vein thrombosis (DVT) prophylaxis- (present on admission)  Assessment & Plan  VTE prophylaxis initially contraindicated secondary to elevated bleeding risk.  3/14 Trauma surveillance venous duplex ultrasonography ordered.  3/13 Lovenox initiated, approved by neurosurgery.

## 2025-03-17 NOTE — PROGRESS NOTES
"Report received from TICU RN.   Patient arrived to T405 via hospital bed, ambulated to bed with stby assist.  Assessment complete.  A&O x 4. Patient calls appropriately.  Patient ambulates with stby assist. Bed alarm off.   Patient has 2/10 pain. Patient medicated per MAR.  Denies N&V. Tolerating regular diet.  Skin per note.  + void, + flatus, + BM.  Patient denies SOB.  SCD's refused.  Patient calm and cooperative with staff and POC at this time.  Review plan with of care with patient. Call light and personal belongings within reach. Hourly rounding in place. All needs met at this time.    BP (!) 155/101   Pulse 85   Temp 36.9 °C (98.4 °F) (Temporal)   Resp 18   Ht 1.854 m (6' 1\")   Wt 102 kg (225 lb)   SpO2 93%   BMI 29.69 kg/m²     "

## 2025-03-17 NOTE — PROGRESS NOTES
4 Eyes Skin Assessment Completed by CODY Fernandez and CODY Miller.    Head WDL  Ears WDL  Nose WDL  Mouth WDL  Neck Redness and abrasion to posterior neck  Breast/Chest WDL  Shoulder Blades WDL  Spine WDL  (R) Arm/Elbow/Hand Redness and Rash previous AC IV site  (L) Arm/Elbow/Hand PIV  Abdomen WDL  Groin WDL  Scrotum/Coccyx/Buttocks WDL  (R) Leg Abrasion  (L) Leg Abrasion  (R) Heel/Foot/Toe WDL  (L) Heel/Foot/Toe WDL      Possible Skin Injury No    Pictures Uploaded Into Epic N/A  Wound Consult Placed N/A  RN Wound Prevention Protocol Ordered No

## 2025-03-17 NOTE — CARE PLAN
The patient is Stable - Low risk of patient condition declining or worsening    Shift Goals  Clinical Goals: Monitor hemodynamic stability, maintain MAP >85, rest  Patient Goals: rest  Family Goals: support    Progress made toward(s) clinical / shift goals:  vitals monitored per policy, neurochecks preformed, care clustered.    Patient is not progressing towards the following goals:

## 2025-03-17 NOTE — PROGRESS NOTES
This RN called to room by CNA. Patient unresponsive sitting on toilet. Able to be roused by this RN, patient noted to be diaphoretic and lethargic. Charge RN to room, vitals taken at this time BP 86/61 pulse, 58, RR 14, O2 sat 94% on RA. Patient aroused and assisted into wheel chair by staff and put back in bed. Neuro assessment completed, unchanged from previous with exception to lethargy. Rapid RN to bedside for assessment. NOC APRN notified.

## 2025-03-17 NOTE — THERAPY
Physical Therapy   Daily Treatment     Patient Name: Ced Emerson  Age:  49 y.o., Sex:  male  Medical Record #: [id number]  Today's Date: 3/17/2025     Precautions  Precautions: Fall Risk;Spinal / Back Precautions   Comments: collar for comfort only.    Assessment    The pt found eating breakfast, able to hold onto cups and utensils wo/difficulty. The pt stated he has a bad experience in the BR this morning, the pt has a syncope incident requiring rapid to be called and w/c transfer BTB. The pt stated he still does not feel good but willing to participate w/PT. Functionally the pt was supervision w/sup<->sit from flat surface and no rails. Once seated, no c/o dizziness. STS and transfers CGA and SBA w/amb hallway distances using the FWW.   No significant drop in BP during PT session, see numbers below.   The pt is apprehensive about going home d/t the event in the BR. Functionally the pt is making improvements and noted improvements w/UE strength.   PT will cont to follow w/recommendations on getting OOB throughout the day.     Plan    Treatment Plan Status: (P) Continue Current Treatment Plan  Type of Treatment: Bed Mobility, Equipment, Gait Training, Neuro Re-Education / Balance, Self Care / Home Evaluation, Stair Training, Therapeutic Activities, Therapeutic Exercise, Family / Caregiver Training  Treatment Frequency: 5 Times per Week  Treatment Duration: Until Therapy Goals Met    DC Equipment Recommendations: (P) Front-Wheel Walker  Discharge Recommendations: (P) Recommend home health for continued physical therapy services     Objective       03/17/25 1238   Time In/Time Out   Therapy Start Time 1159   Therapy End Time 1238   Total Therapy Time 39   Charge Group   PT Gait Training (Units) 1   PT Therapeutic Activities (Units) 2   Total Time Spent   PT Gait Training Time Spent (Mins) 15   PT Therapeutic Activities Time Spent (Mins) 24   PT Total Time Spent (Calculated) 39   Precautions   Precautions Fall  Risk;Spinal / Back Precautions    Comments collar for comfort only.   Other Treatments   Other Treatments Provided Multiple BP's taken during PT session. The pt did have syncope in the BR early morning. BP during PT: Supine 131/82 MAP 98, /81 MAP 96-> 120/80 MAP 93, Standing 135/79 MAP 98, Post ambulation around unit 115/88 MAP 97. Pt instructed on use of IS w/return demonstration.   Balance   Sitting Balance (Static) Good   Sitting Balance (Dynamic) Fair +   Standing Balance (Static) Fair   Standing Balance (Dynamic) Fair -   Weight Shift Sitting Good   Weight Shift Standing Fair   Skilled Intervention Verbal Cuing   Comments standing w/FWW   Bed Mobility    Supine to Sit Supervised   Sit to Supine Supervised   Scooting Supervised   Rolling Supervised   Skilled Intervention Verbal Cuing   Comments Pt instructed on log rolling to maintain NSP. The pt demo sup<->sit from both side of the bed. No railing and HOB flat.   Gait Analysis   Gait Level Of Assist Standby Assist   Assistive Device Front Wheel Walker   Distance (Feet)   (pt amb around nrsg unit > 500')   # of Times Distance was Traveled 1   Skilled Intervention Verbal Cuing   Comments Today amb efforts w/FWW managing increase in distance w/less assistance.   Functional Mobility   Sit to Stand Contact Guard Assist   Bed, Chair, Wheelchair Transfer Contact Guard Assist   Toilet Transfers Contact Guard Assist   Skilled Intervention Verbal Cuing   Comments The pt remains CGA w/STS and transfers using the FWW.   6 Clicks Assessment - How much HELP from from another person do you currently need... (If the patient hasn't done an activity recently, how much help from another person do you think he/she would need if he/she tried?)   Turning from your back to your side while in a flat bed without using bedrails? 3   Moving from lying on your back to sitting on the side of a flat bed without using bedrails? 3   Moving to and from a bed to a chair (including a  wheelchair)? 3   Standing up from a chair using your arms (e.g., wheelchair, or bedside chair)? 3   Walking in hospital room? 3   Climbing 3-5 steps with a railing? 3   6 clicks Mobility Score 18   Short Term Goals    Short Term Goal # 1 Pt will perform supine <> sit with SPV in 6 visits to progress bed mobility   Goal Outcome # 1 Goal met   Short Term Goal # 2 Pt will perform STS with SPV in 6 visits to progress OOB mobility   Goal Outcome # 2 Goal not met   Short Term Goal # 3 Pt will perform stand pivot transfers with SPV in 6 visits to progress functional OOB mobility   Goal Outcome # 3 Goal not met   Short Term Goal # 4 Pt will ambulate 500 ft with  SPV in 6 visits to progress functional gait   Goal Outcome # 4 Goal not met   Short Term Goal # 5 Pt will ascend & descend 3 steps with SPV in 6 visits to access home   Goal Outcome # 5 Goal not met   Education Group   Spine Precautions Patient Response Patient;Acceptance;Explanation;Action Demonstration   Physical Therapy Treatment Plan   Physical Therapy Treatment Plan Continue Current Treatment Plan   Anticipated Discharge Equipment and Recommendations   DC Equipment Recommendations Front-Wheel Walker   Discharge Recommendations Recommend home health for continued physical therapy services   Interdisciplinary Plan of Care Collaboration   IDT Collaboration with  Nursing   Patient Position at End of Therapy Seated;Chair Alarm On;Call Light within Reach;Tray Table within Reach;Phone within Reach   Collaboration Comments Nrsg notified of pts tx efforts   Session Information   Date / Session Number  3/17--2 (2/5, 3/20) PTA/1   Supervising Physical Therapist (PTA Treatments Only)   Supervising Physical Therapist Tanya Emerson

## 2025-03-17 NOTE — FACE TO FACE
Face to Face Supporting Documentation - Home Health    The encounter with this patient was in whole or in part the primary reason for home health admission.    Date of encounter:   Patient:                    MRN:                       YOB: 2027  Ced Emerson  [id number]  1975     Home health to see patient for:  Skilled Nursing care for assessment, interventions & education and Physical Therapy evaluation and treatment    Skilled need for:  New Onset Medical Diagnosis central cord syndrome    Skilled nursing interventions to include:  Comment: medication management     Homebound status evidenced by:  Need the aid of supportive devices such as crutches, canes, wheelchairs or walkers or Needs the assistance of another person in order to leave the home. Leaving home requires a considerable and taxing effort. There is a normal inability to leave the home.    Community Physician to provide follow up care: Burke Rivera M.D.     Optional Interventions? No      I certify the face to face encounter for this home health care referral meets the CMS requirements and the encounter/clinical assessment with the patient was, in whole, or in part, for the medical condition(s) listed above, which is the primary reason for home health care. Based on my clinical findings: the service(s) are medically necessary, support the need for home health care, and the homebound criteria are met.  I certify that this patient has had a face to face encounter by myself.  MARIEL Ulrich. - NATEI: [phone]

## 2025-03-17 NOTE — DISCHARGE PLANNING
Case Management Discharge Planning    Admission Date: 3/12/2025  GMLOS: 2.6  ALOS: 5    6-Clicks ADL Score: 17  6-Clicks Mobility Score: 18  PT and/or OT Eval ordered: Yes  Post-acute Referrals Ordered: Yes  Post-acute Choice Obtained: Yes  Has referral(s) been sent to post-acute provider:  Yes      Anticipated Discharge Dispo: Discharge Disposition: D/T to home under HHA care in anticipation of covered skilled care (06)  Discharge Address: Home ([address] [place] [id number])  Discharge Contact Phone Number: Rachelle ()    DME Needed: No    Action(s) Taken: Updated Provider/Nurse on Discharge Plan    Pt was discussed in IDT rounds with Edith BAEZ .    Pt has a C4  nd Workers Comp insurance.     Pt was declined by both [facility] and [facility] they do not accept workers comp and they do not service [facility].      Workers Comp CM of [facility]  P: [phone]    Gina Mccormick      C4 from  [facility] CM sent to [facility] via email     This RN CM spoke with Nesha, Dharmesh anaya [facility] .     Pt has been accepted with [facility] pending auth      Informed Edith BAEZ and CODY Dejesus of this update.     Escalations Completed: None    Medically Clear: Yes for [facility]    Next Steps:   CM to continue to assist Pt with discharge as needed    Barriers to Discharge:   Medical clearance  Pending placement     Is the patient up for discharge tomorrow: No

## 2025-03-18 VITALS
TEMPERATURE: 98.6 F | SYSTOLIC BLOOD PRESSURE: 143 MMHG | RESPIRATION RATE: 18 BRPM | BODY MASS INDEX: 29.82 KG/M2 | OXYGEN SATURATION: 90 % | HEART RATE: 74 BPM | DIASTOLIC BLOOD PRESSURE: 83 MMHG | WEIGHT: 225 LBS | HEIGHT: 73 IN

## 2025-03-18 PROCEDURE — A9270 NON-COVERED ITEM OR SERVICE: HCPCS | Performed by: PHYSICIAN ASSISTANT

## 2025-03-18 PROCEDURE — A9270 NON-COVERED ITEM OR SERVICE: HCPCS | Performed by: NURSE PRACTITIONER

## 2025-03-18 PROCEDURE — 700102 HCHG RX REV CODE 250 W/ 637 OVERRIDE(OP): Performed by: NURSE PRACTITIONER

## 2025-03-18 PROCEDURE — 700102 HCHG RX REV CODE 250 W/ 637 OVERRIDE(OP): Performed by: PHYSICIAN ASSISTANT

## 2025-03-18 PROCEDURE — 700111 HCHG RX REV CODE 636 W/ 250 OVERRIDE (IP): Performed by: PHYSICIAN ASSISTANT

## 2025-03-18 PROCEDURE — 99239 HOSP IP/OBS DSCHRG MGMT >30: CPT | Performed by: NURSE PRACTITIONER

## 2025-03-18 RX ORDER — ACETAMINOPHEN 500 MG
1000 TABLET ORAL EVERY 8 HOURS PRN
Status: SHIPPED
Start: 2025-03-18

## 2025-03-18 RX ORDER — ENOXAPARIN SODIUM 100 MG/ML
30 INJECTION SUBCUTANEOUS EVERY 12 HOURS
Status: ACTIVE | DISCHARGE
Start: 2025-03-18

## 2025-03-18 RX ORDER — MIDODRINE HYDROCHLORIDE 10 MG/1
10 TABLET ORAL EVERY 8 HOURS
Status: SHIPPED
Start: 2025-03-18

## 2025-03-18 RX ORDER — METAXALONE 800 MG/1
800 TABLET ORAL 3 TIMES DAILY
Status: SHIPPED
Start: 2025-03-18

## 2025-03-18 RX ORDER — ONDANSETRON 4 MG/1
4 TABLET, ORALLY DISINTEGRATING ORAL EVERY 4 HOURS PRN
Status: SHIPPED
Start: 2025-03-18

## 2025-03-18 RX ORDER — OXYCODONE HYDROCHLORIDE 5 MG/1
5 TABLET ORAL
Status: SHIPPED
Start: 2025-03-18 | End: 2025-03-25

## 2025-03-18 RX ORDER — GABAPENTIN 300 MG/1
300 CAPSULE ORAL EVERY 8 HOURS
Status: SHIPPED
Start: 2025-03-18

## 2025-03-18 RX ORDER — POLYETHYLENE GLYCOL 3350 17 G/17G
17 POWDER, FOR SOLUTION ORAL 2 TIMES DAILY
Status: SHIPPED
Start: 2025-03-18

## 2025-03-18 RX ORDER — BISACODYL 10 MG
10 SUPPOSITORY, RECTAL RECTAL
Status: SHIPPED
Start: 2025-03-18

## 2025-03-18 RX ORDER — AMOXICILLIN 250 MG
1 CAPSULE ORAL
Status: SHIPPED
Start: 2025-03-18

## 2025-03-18 RX ORDER — AMOXICILLIN 250 MG
1 CAPSULE ORAL NIGHTLY
Status: SHIPPED
Start: 2025-03-18

## 2025-03-18 RX ORDER — PSEUDOEPHEDRINE HCL 30 MG
100 TABLET ORAL 2 TIMES DAILY
Status: SHIPPED
Start: 2025-03-18

## 2025-03-18 RX ADMIN — ALBUTEROL SULFATE 2 PUFF: 90 AEROSOL, METERED RESPIRATORY (INHALATION) at 07:08

## 2025-03-18 RX ADMIN — GABAPENTIN 300 MG: 300 CAPSULE ORAL at 06:10

## 2025-03-18 RX ADMIN — DOCUSATE SODIUM 100 MG: 100 CAPSULE, LIQUID FILLED ORAL at 06:11

## 2025-03-18 RX ADMIN — ALBUTEROL SULFATE 2 PUFF: 90 AEROSOL, METERED RESPIRATORY (INHALATION) at 01:34

## 2025-03-18 RX ADMIN — METAXALONE 800 MG: 800 TABLET ORAL at 06:16

## 2025-03-18 RX ADMIN — POLYETHYLENE GLYCOL 3350 1 PACKET: 17 POWDER, FOR SOLUTION ORAL at 06:13

## 2025-03-18 RX ADMIN — GABAPENTIN 300 MG: 300 CAPSULE ORAL at 13:08

## 2025-03-18 RX ADMIN — MIDODRINE HYDROCHLORIDE 10 MG: 5 TABLET ORAL at 06:10

## 2025-03-18 RX ADMIN — METAXALONE 800 MG: 800 TABLET ORAL at 13:08

## 2025-03-18 RX ADMIN — ENOXAPARIN SODIUM 30 MG: 100 INJECTION SUBCUTANEOUS at 06:11

## 2025-03-18 RX ADMIN — OMEPRAZOLE 20 MG: 20 CAPSULE, DELAYED RELEASE ORAL at 06:10

## 2025-03-18 ASSESSMENT — PAIN DESCRIPTION - PAIN TYPE
TYPE: ACUTE PAIN

## 2025-03-18 NOTE — DISCHARGE PLANNING
Case Management Discharge Planning    Admission Date: 3/12/2025  GMLOS: 2.6  ALOS: 6    6-Clicks ADL Score: 17  6-Clicks Mobility Score: 18  PT and/or OT Eval ordered: Yes  Post-acute Referrals Ordered: Yes  Post-acute Choice Obtained: Yes  Has referral(s) been sent to post-acute provider:  Yes      Anticipated Discharge Dispo: Discharge Disposition: Disch to a long term care facility (63)  Discharge Address: Home ([address] [place] [id number])  Discharge Contact Phone Number: Rachelle ()    DME Needed: No    Action(s) Taken: Updated Provider/Nurse on Discharge Plan    Pt was discussed in IDT rounds.   Pt  has been accepted with [facility] today at 15:30.  Informed Edith BAEZ and CODY Dejesus of this update.    This RN CM sent  a [facility] request to Lois.    : Chyna  , contact Gina Jace  Tel    Fax .     This RN CM prepared [facility]/ transfer packet .    Per Trent, [facility] is all set at 15:30 to [facility].    Escalations Completed: None    Medically Clear: Yes    Next Steps:   CM to continue to assist Pt with discharge as needed    Barriers to Discharge:   No    Is the patient up for discharge tomorrow: No

## 2025-03-18 NOTE — DISCHARGE SUMMARY
Trauma Discharge Summary    DATE OF ADMISSION: 3/12/2025    DATE OF DISCHARGE: 3/18/2025    LENGTH OF STAY: 6 days    ATTENDING PHYSICIAN: Trauma team    CONSULTING PHYSICIAN:   1. Capo Teran MD, neurosurgery  2.  Kwaku Mccain DO, physiatry    DISCHARGE DIAGNOSIS:  Principal Problem:    Trauma  Active Problems:    Central cord syndrome at C5 level of cervical spinal cord (HCC)    No contraindication to deep vein thrombosis (DVT) prophylaxis    Asthma    Acid reflux  Resolved Problems:    * No resolved hospital problems. *      PROCEDURES : None     HISTORY OF PRESENT ILLNESS: The patient is a 49 y.o. male who was reportedly injured when he was struck by a tarp while unloading hay.  He reported pain to his neck and paresthesias to his extremities..  He was transferred to [facility] in [place], [place].    HOSPITAL COURSE: The patient was triaged as a partial trauma transfer activation. The patient was transported to intensive care unit where he remained for roughly 5 days and participated in hyperperfusion therapy.  He was then transferred to the [facility] where he has been for his stay.  Neurosurgery was consulted and felt there was no collar needed.  Again he has completed his hyperperfusion protocol at this time.  His strength is 5 of 5 and equal.   are slightly weaker.  Main complaint is the burning sensation in his extremities.  At this time he will be transferred to a long-term acute care hospital where he will continue to participate in therapies..    HOSPITAL PROBLEM LIST:  * Trauma- (present on admission)  Assessment & Plan  Ground level fall.   Transient weakness immediately after the fall. Persistent paresthesias since.  Trauma Green Transfer Activation from [facility] in [place], [place].  Bernardino Aguirre MD. Trauma Surgery.    Central cord syndrome at C5 level of cervical spinal cord (HCC)- (present on admission)  Assessment & Plan  Central cord swelling without  acute cervical fracture. C5 and C6 stenosis on CT scan.  Transient weakness immediately after the fall, with return of full strength. Persistent paresthesias since.   MRI completed, cord contusion.  Non-operative management.   3/14 Continues Hyperperfusion protocol, MAP > 85 x 5-days.  3/16 Off levophed.  Midodrine with hold for MAP > 100.   Paresthesias. Faint  /deltoid weakness at times  No bracing required.  Capo Teran MD. Neurosurgeon. [facility].  Physiatry consulted.    Acid reflux- (present on admission)  Assessment & Plan  Chronic condition treated with omeprazole.  Resumed maintenance medication on admission.    Asthma- (present on admission)  Assessment & Plan  Chronic condition treated with albuterol.  Resumed maintenance medication on admission.    No contraindication to deep vein thrombosis (DVT) prophylaxis- (present on admission)  Assessment & Plan  VTE prophylaxis initially contraindicated secondary to elevated bleeding risk.  3/14 Trauma surveillance venous duplex ultrasonography ordered.  3/13 Lovenox initiated, approved by neurosurgery.    DISPOSITION: Discharged [facility] on 3/18.     DISCHARGE MEDICATIONS:  The patients controlled substance history was reviewed and a controlled substance use informed consent (if applicable) was provided by [facility] and the patient has been prescribed.     Medication List        START taking these medications        Instructions   acetaminophen 500 MG Tabs  Commonly known as: Tylenol   Take 2 Tablets by mouth every 8 hours as needed for Mild Pain or Moderate Pain.  Dose: 1,000 mg     bisacodyl 10 MG Supp  Commonly known as: Dulcolax   Insert 1 Suppository into the rectum every 24 hours as needed (if senna-docusate ineffective or unavailable.).  Dose: 10 mg     docusate sodium 100 MG Caps   Take 100 mg by mouth 2 times a day.  Dose: 100 mg     enoxaparin 30 MG/0.3ML Sosy inj  Commonly known as: Lovenox   Inject 0.3 mL under  the skin every 12 hours.  Dose: 30 mg     gabapentin 300 MG Caps  Commonly known as: Neurontin   Take 1 Capsule by mouth every 8 hours.  Dose: 300 mg     magnesium hydroxide 400 MG/5ML Susp  Commonly known as: Milk Of Magnesia   Take 30 mL by mouth every day at 6 PM. Indications: Constipation  Dose: 30 mL     metaxalone 800 MG Tabs  Commonly known as: Skelaxin   Take 1 Tablet by mouth 3 times a day.  Dose: 800 mg     midodrine 10 MG tablet  Commonly known as: Proamatine   Take 1 Tablet by mouth every 8 hours.  Dose: 10 mg     ondansetron 4 MG Tbdp  Commonly known as: Zofran ODT   Take 1 Tablet by mouth every four hours as needed for Nausea/Vomiting (give PO if no IV route availalbe).  Dose: 4 mg     oxyCODONE immediate-release 5 MG Tabs  Commonly known as: Roxicodone   Take 1 Tablet by mouth every 3 hours as needed for Severe Pain for up to 7 days. Indications: Acute Pain  Dose: 5 mg     polyethylene glycol/lytes Pack  Commonly known as: Miralax   Take 1 Packet by mouth 2 times a day.  Dose: 17 g     * senna-docusate 8.6-50 MG Tabs  Commonly known as: Pericolace Or Senokot S   Take 1 Tablet by mouth every evening.  Dose: 1 Tablet     * senna-docusate 8.6-50 MG Tabs  Commonly known as: Pericolace Or Senokot S   Take 1 Tablet by mouth every 24 hours as needed for Constipation.  Dose: 1 Tablet           * This list has 2 medication(s) that are the same as other medications prescribed for you. Read the directions carefully, and ask your doctor or other care provider to review them with you.                CONTINUE taking these medications        Instructions   albuterol 108 (90 Base) MCG/ACT Aers inhalation aerosol   Inhale 2 Puffs by mouth every 6 hours as needed for Shortness of Breath.  Dose: 2 Puff     ibuprofen 800 MG Tabs  Commonly known as: Motrin   Take 800 mg by mouth 1 time daily as needed.  Dose: 800 mg     PriLOSEC 20 MG delayed-release capsule  Generic drug: omeprazole   Take 20 mg by mouth every  day.  Dose: 20 mg              ACTIVITY:  As tolerated    WOUND CARE:  Not applicable     DIET:  Orders Placed This Encounter   Procedures    Diet Order Diet: Regular     Standing Status:   Standing     Number of Occurrences:   1     Diet::   Regular [1]       FOLLOW UP:  [facility]  [address]  [address]  [place] [id number] [phone]        Capo Teran M.D.  [address]   [place] [address]  [phone]    Follow up        TIME SPENT ON DISCHARGE: 45 minutes      ____________________________________________  DANYELL UlrichPMEREDITH    DD: 3/18/2025 12:00 PM

## 2025-03-18 NOTE — CARE PLAN
Problem: Knowledge Deficit - Standard  Goal: Patient and family/care givers will demonstrate understanding of plan of care, disease process/condition, diagnostic tests and medications  Outcome: Progressing     Problem: Pain - Standard  Goal: Alleviation of pain or a reduction in pain to the patient’s comfort goal  Outcome: Progressing     Problem: Skin Integrity  Goal: Skin integrity is maintained or improved  Outcome: Progressing     Problem: Fall Risk  Goal: Patient will remain free from falls  Outcome: Progressing     Problem: Neuro Status  Goal: Neuro status will remain stable or improve  Outcome: Progressing     Problem: Self Care  Goal: Patient will have the ability to perform ADLs independently or with assistance (bathe, groom, dress, toilet and feed)  Outcome: Progressing     Problem: Mobility  Goal: Patient's capacity to carry out activities will improve  Outcome: Progressing    Shift Goals  Clinical Goals: neuro checks, hymodynamic stability, pain management  Patient Goals: pain control, comfort  Family Goals: support patient, updates

## 2025-03-18 NOTE — PROGRESS NOTES
"Bedside report received.  Assessment complete.  A&O x 4. Patient calls appropriately.  Patient ambulates with standby assist w/ FWW.   Patient has 4/10 pain. Patient medicated per MAR.  Denies N&V. Tolerating regular diet.  Skin per flowsheets.  + void, + flatus, LBM 3/16.  Patient denies SOB, on room air.    Review plan with of care with patient. Call light and personal belongings within reach. Hourly rounding in place. All needs met at this time.    /82   Pulse 72   Temp 37.1 °C (98.8 °F) (Temporal)   Resp 18   Ht 1.854 m (6' 1\")   Wt 102 kg (225 lb)   SpO2 92%   BMI 29.69 kg/m²     "

## 2025-03-18 NOTE — CARE PLAN
The patient is Stable - Low risk of patient condition declining or worsening    Shift Goals  Clinical Goals: neuro checks, hymodynamic stability, pain management  Patient Goals: pain control, comfort  Family Goals: support patient, updates    Progress made toward(s) clinical / shift goals:    Problem: Knowledge Deficit - Standard  Goal: Patient and family/care givers will demonstrate understanding of plan of care, disease process/condition, diagnostic tests and medications  Description: Target End Date:  1-3 days or as soon as patient condition allowsDocument in Patient Education1.  Patient and family/caregiver oriented to unit, equipment, visitation policy and means for communicating concern2.  Complete/review Learning Assessment3.  Assess knowledge level of disease process/condition, treatment plan, diagnostic tests and medications4.  Explain disease process/condition, treatment plan, diagnostic tests and medications  Outcome: Progressing     Problem: Pain - Standard  Goal: Alleviation of pain or a reduction in pain to the patient’s comfort goal  Description: Target End Date:  Prior to discharge or change in level of careDocument on Vitals flowsheet1.  Document pain using the appropriate pain scale per order or unit policy2.  Educate and implement non-pharmacologic comfort measures (i.e. relaxation, distraction, massage, cold/heat therapy, etc.)3.  Pain management medications as ordered4.  Reassess pain after pain med administration per policy5.  If opiods administered assess patient's response to pain medication is appropriate per POSS sedation scale6.  Follow pain management plan developed in collaboration with patient and interdisciplinary team (including palliative care or pain specialists if applicable)  Outcome: Progressing     Problem: Skin Integrity  Goal: Skin integrity is maintained or improved  Description: Target End Date:  Prior to discharge or change in level of careDocument interventions on Skin  Risk/Eduin flowsheet groups and corresponding LDA1.  Assess and monitor skin integrity, appearance and/or temperature2.  Assess risk factors for impaired skin integrity and/or pressures ulcers3.  Implement precautions to protect skin integrity in collaboration with interdisciplinary team4.  Implement pressure ulcer prevention protocol if at risk for skin breakdown5.  Confirm wound care consult if at risk for skin breakdown6.  Ensure patient use of pressure relieving devices  (Low air loss bed, waffle overlay, heel protectors, ROHO cushion, etc)  Outcome: Progressing       Patient is not progressing towards the following goals:

## 2025-03-18 NOTE — PROGRESS NOTES
Neurosurgery Progress Note    Subjective:  No acute events over night  Paresthesia improving  Feeling much better today    Exam:    BUE 5/5   BLE 5/5        BP  Min: 109/73  Max: 143/81  Pulse  Av.8  Min: 61  Max: 82  Resp  Av.9  Min: 12  Max: 18  Temp  Av.8 °C (98.3 °F)  Min: 36.5 °C (97.7 °F)  Max: 37.1 °C (98.8 °F)  SpO2  Av.9 %  Min: 90 %  Max: 96 %    No data recorded    Recent Labs     25  0644 25  0029   WBC 5.0 6.8   RBC 5.46 5.44   HEMOGLOBIN 15.2 15.3   HEMATOCRIT 44.4 44.5   MCV 81.3* 81.8   MCH 27.8 28.1   MCHC 34.2 34.4   RDW 40.6 39.8   PLATELETCT 171 181   MPV 8.1* 8.3*     Recent Labs     2544 25  0029   SODIUM 138 139   POTASSIUM 3.9 3.7   CHLORIDE 102 103   CO2 25 24   GLUCOSE 98 93   BUN 16 13   CREATININE 1.05 0.94   CALCIUM 9.4 9.7                 Intake/Output                         25 0700 - 25 0659 25 07 - 25 0659     [id number] 5802-5736 Total 1900-0659 Total                 Intake    Total Intake -- -- -- -- -- --       Output    Urine  --  525 525  --  -- --    Number of Times Voided 1 x 3 x 4 x -- -- --    Urine Void (mL) -- 525 525 -- -- --    Total Output -- 525 525 -- -- --       Net I/O     -- -525 -525 -- -- --              Intake/Output Summary (Last 24 hours) at 3/18/2025 0829  Last data filed at 3/17/2025 2308  Gross per 24 hour   Intake --   Output 525 ml   Net -525 ml             midodrine  10 mg Q8HR    gabapentin  300 mg Q8HRS    albuterol  2 Puff Q6HRS PRN    omeprazole  20 mg DAILY    acetaminophen  1,000 mg Q8HRS PRN    enoxaparin (LOVENOX) injection  30 mg Q12HRS    Respiratory Therapy Consult   Continuous RT    ondansetron  4 mg Q4HRS PRN    Or    ondansetron  4 mg Q4HRS PRN    docusate sodium  100 mg BID    senna-docusate  1 Tablet Nightly    senna-docusate  1 Tablet Q24HRS PRN    polyethylene glycol/lytes  1 Packet BID    magnesium hydroxide  30 mL DAILY AT 1800    bisacodyl  10 mg  Q24HRS PRN    sodium phosphate  1 Enema Once PRN    oxyCODONE immediate-release  5 mg Q3HRS PRN    Or    oxyCODONE immediate-release  10 mg Q3HRS PRN    Or    HYDROmorphone  0.5 mg Q3HRS PRN    metaxalone  800 mg TID       Assessment and Plan:  Hospital day #6  POD #na  Prophylactic anticoagulation: yes             Central cord injury  Clinically doing well. Completed hyper-perfusion therapy as of today  OK to wean off Midodrine as tolerated  No need for cervical collar  Routine vital checks  OK to [facility]

## 2025-03-18 NOTE — PROGRESS NOTES
Virtual Nurse rounding complete.    Round Needs: No needs at this time. Pet therapy at bedside.

## 2025-03-18 NOTE — PROGRESS NOTES
Assumed care of patient at 0645. Bedside report received. Assessment complete.  AA&Ox4. Denies CP/SOB. Room air   Neuro intact, patient states that his arm gets numb when bloodpressure is being taken; No numbness and tingling otherwise.     Reporting pain controlled at this time. Declined intervention at this time.     Skin per flow sheets. Generalized bruising and abrasions     Tolerating diet. Denies N/V.  + void. + BM. Last BM 3/16    Pt ambulates SBA  Fall risk per Andre Michael score. Fall prevention measures in place per flowsheets.  Ambulating frequently, Pharmacologic VTE prophylaxis in use.  Plan of care discussed, all questions answered.  Educated regarding importance of oral care. Oral care kit at bedside. Call light is within reach, treaded slipper socks on, bed in lowest/ locked position, hourly rounding in place, all needs met at this time.

## 2025-03-19 NOTE — PROGRESS NOTES
Discharging Patient to [facility] per physician order.  Discharged with DC paperwork, C-collar and personal belongings.  Report, face sheet and COBRA provided to 2 [facility] transport staff.      Demonstrated understanding of discharge instructions, follow up appointments   Ambulating without assistance, neuro intact, voiding without difficulty, pain well controlled, tolerating oral medications, oxygen saturation greater than 90% , tolerating diet. Educational handouts given and discussed.  Verbalized understanding of discharge instructions and educational handouts.  Stated several reasons why to return to ED or seek medical attention. All questions answered.  Belongings and dressing supplies with patient at time of discharge.

## (undated) DEVICE — ELECTRODE DUAL RETURN W/ CORD - (50/PK)

## (undated) DEVICE — SET LEADWIRE 5 LEAD BEDSIDE DISPOSABLE ECG (1SET OF 5/EA)

## (undated) DEVICE — SUCTION INSTRUMENT YANKAUER BULBOUS TIP W/O VENT (50EA/CA)

## (undated) DEVICE — KIT ANESTHESIA W/CIRCUIT & 3/LT BAG W/FILTER (20EA/CA)

## (undated) DEVICE — STERI STRIP COMPOUND BENZOIN - TINCTURE 0.6ML WITH APPLICATOR (40EA/BX)

## (undated) DEVICE — HEAD HOLDER JUNIOR/ADULT

## (undated) DEVICE — MASK ANESTHESIA ADULT  - (100/CA)

## (undated) DEVICE — SYRINGE 10 ML CONTROL LL (25EA/BX 4BX/CA)

## (undated) DEVICE — KIT ROOM DECONTAMINATION

## (undated) DEVICE — GLOVE BIOGEL SZ 8 SURGICAL PF LTX - (50PR/BX 4BX/CA)

## (undated) DEVICE — NEPTUNE 4 PORT MANIFOLD - (20/PK)

## (undated) DEVICE — DRAPE LAPAROTOMY T SHEET - (12EA/CA)

## (undated) DEVICE — GLOVE BIOGEL INDICATOR SZ 8.5 SURGICAL PF LTX - (50/BX 4BX/CA)

## (undated) DEVICE — LACTATED RINGERS INJ 1000 ML - (14EA/CA 60CA/PF)

## (undated) DEVICE — SPONGE GAUZESTER 4 X 4 4PLY - (128PK/CA)

## (undated) DEVICE — PROTECTOR ULNA NERVE - (36PR/CA)

## (undated) DEVICE — SUTURE 3-0 VICRYL PLUS SH - 8X 18 INCH (12/BX)

## (undated) DEVICE — LEAD SET 6 DISP. EKG NIHON KOHDEN (100EA/CA) [9859].

## (undated) DEVICE — SPONGE GAUZE STER 4X4 8-PL - (2/PK 50PK/BX 12BX/CS)

## (undated) DEVICE — CLOSURE SKIN STRIP 1/2 X 4 IN - (STERI STRIP) (50/BX 4BX/CA)

## (undated) DEVICE — BLADE SURGICAL #15 - (50/BX 3BX/CA)

## (undated) DEVICE — SODIUM CHL IRRIGATION 0.9% 1000ML (12EA/CA)

## (undated) DEVICE — SLEEVE, VASO, THIGH, MED

## (undated) DEVICE — SUTURE GENERAL

## (undated) DEVICE — BLADE SURGICAL CLIPPER - (50EA/CA)

## (undated) DEVICE — GOWN WARMING STANDARD FLEX - (30/CA)

## (undated) DEVICE — DRESSING TRANSPARENT FILM TEGADERM 4 X 4.75" (50EA/BX)"

## (undated) DEVICE — CANISTER SUCTION 3000ML MECHANICAL FILTER AUTO SHUTOFF MEDI-VAC NONSTERILE LF DISP  (40EA/CA)

## (undated) DEVICE — TUBING CLEARLINK DUO-VENT - C-FLO (48EA/CA)

## (undated) DEVICE — PACK MINOR BASIN - (2EA/CA)

## (undated) DEVICE — GLOVE BIOGEL PI ORTHO SZ 6 SURGICAL PF LF (40PR/BX)

## (undated) DEVICE — DRESSING NON ADHERENT 3 X 4 - STERILE (100/BX 12BX/CA)

## (undated) DEVICE — ELECTRODE 850 FOAM ADHESIVE - HYDROGEL RADIOTRNSPRNT (50/PK)

## (undated) DEVICE — SUTURE 4-0 MONOCRYL PLUS PS-1 - 27 INCH (36/BX)

## (undated) DEVICE — CHLORAPREP 26 ML APPLICATOR - ORANGE TINT(25/CA)

## (undated) DEVICE — SENSOR SPO2 NEO LNCS ADHESIVE (20/BX) SEE USER NOTES

## (undated) DEVICE — SET EXTENSION WITH 2 PORTS (48EA/CA) ***PART #2C8610 IS A SUBSTITUTE*****